# Patient Record
Sex: MALE | Race: WHITE | NOT HISPANIC OR LATINO | Employment: OTHER | URBAN - METROPOLITAN AREA
[De-identification: names, ages, dates, MRNs, and addresses within clinical notes are randomized per-mention and may not be internally consistent; named-entity substitution may affect disease eponyms.]

---

## 2017-01-06 ENCOUNTER — CONVERSION ENCOUNTER (OUTPATIENT)
Dept: ORTHOPEDIC SURGERY | Facility: CLINIC | Age: 62
End: 2017-01-06

## 2017-01-06 LAB
ALBUMIN SERPL-MCNC: 4.7 G/DL (ref 3.6–5.1)
ALBUMIN/GLOB SERPL: ABNORMAL {RATIO} (ref 1–2.5)
ALP SERPL-CCNC: 56 UNITS/L (ref 40–115)
ALT SERPL-CCNC: 29 UNITS/L (ref 9–46)
AST SERPL-CCNC: 25 UNITS/L (ref 10–35)
BASOPHILS # BLD AUTO: ABNORMAL 10*3/MM3 (ref 0–200)
BASOPHILS NFR BLD AUTO: 0.5 %
BILIRUB SERPL-MCNC: 0.6 MG/DL (ref 0.2–1.2)
BUN SERPL-MCNC: 19 MG/DL (ref 7–25)
BUN/CREAT SERPL: ABNORMAL (ref 6–22)
CALCIUM SERPL-MCNC: 9.5 MG/DL (ref 8.6–10.3)
CHLORIDE SERPL-SCNC: 107 MMOL/L (ref 98–110)
CHOLEST SERPL-MCNC: 242 MG/DL (ref 125–200)
CHOLEST/HDLC SERPL: ABNORMAL {RATIO}
CO2 CONTENT VENOUS: 23 MMOL/L (ref 20–31)
CONV NEUTROPHILS/100 LEUKOCYTES IN BODY FLUID BY MANUAL COUNT: 66 %
CONV TOTAL PROTEIN: 7.5 G/DL (ref 6.1–8.1)
CREAT UR-MCNC: 1.08 MG/DL (ref 0.7–1.25)
EOSINOPHIL # BLD AUTO: 2.1 %
EOSINOPHIL # BLD AUTO: ABNORMAL 10*3/MM3 (ref 15–500)
ERYTHROCYTE [DISTWIDTH] IN BLOOD BY AUTOMATED COUNT: 13.9 % (ref 11–15)
GLOBULIN UR ELPH-MCNC: ABNORMAL G/DL (ref 1.9–3.7)
GLUCOSE SERPL-MCNC: 102 MG/DL (ref 65–99)
HCT VFR BLD AUTO: 44.8 % (ref 38.5–50)
HDLC SERPL-MCNC: 64 MG/DL
HGB BLD-MCNC: 14.9 G/DL (ref 13.2–17.1)
LDLC SERPL CALC-MCNC: ABNORMAL MG/DL
LYMPHOCYTES # BLD AUTO: ABNORMAL 10*3/MM3 (ref 850–3900)
LYMPHOCYTES NFR BLD AUTO: 24.5 %
MCH RBC QN AUTO: 30.7 PG (ref 27–33)
MCHC RBC AUTO-ENTMCNC: ABNORMAL % (ref 32–36)
MCV RBC AUTO: 91.9 FL (ref 80–100)
MONOCYTES # BLD AUTO: ABNORMAL 10*3/MICROLITER (ref 200–950)
MONOCYTES NFR BLD AUTO: 6.9 %
NEUTROPHILS # BLD AUTO: ABNORMAL 10*3/MM3 (ref 1500–7800)
PLATELET # BLD AUTO: ABNORMAL 10*3/MM3 (ref 140–400)
PMV BLD AUTO: 8.7 FL (ref 7.5–11.5)
POTASSIUM SERPL-SCNC: 4.3 MMOL/L (ref 3.5–5.3)
PSA SERPL-MCNC: 0.3 NG/ML
RBC # BLD AUTO: ABNORMAL 10*6/MM3 (ref 4.2–5.8)
SODIUM SERPL-SCNC: 139 MMOL/L (ref 135–146)
TRIGL SERPL-MCNC: 127 MG/DL
TSH SERPL-ACNC: 3.06 MICROINTL UNITS/ML (ref 0.4–4.5)
URATE SERPL-MCNC: 5.7 MG/DL (ref 4–8)
WBC # BLD AUTO: ABNORMAL K/UL (ref 3.8–10.8)

## 2017-04-01 LAB
ALBUMIN SERPL-MCNC: 4.4 G/DL (ref 3.6–5.1)
ALBUMIN/GLOB SERPL: ABNORMAL {RATIO} (ref 1–2.5)
ALP SERPL-CCNC: 61 UNITS/L (ref 40–115)
ALT SERPL-CCNC: 29 UNITS/L (ref 9–46)
AST SERPL-CCNC: 29 UNITS/L (ref 10–35)
BILIRUB SERPL-MCNC: 0.8 MG/DL (ref 0.2–1.2)
BUN SERPL-MCNC: 21 MG/DL (ref 7–25)
BUN/CREAT SERPL: ABNORMAL (ref 6–22)
CALCIUM SERPL-MCNC: 9.6 MG/DL (ref 8.6–10.3)
CHLORIDE SERPL-SCNC: 106 MMOL/L (ref 98–110)
CHOLEST SERPL-MCNC: 141 MG/DL (ref 125–200)
CHOLEST/HDLC SERPL: ABNORMAL {RATIO}
CO2 CONTENT VENOUS: 22 MMOL/L (ref 20–31)
CONV TOTAL PROTEIN: 7 G/DL (ref 6.1–8.1)
CREAT UR-MCNC: 1.09 MG/DL (ref 0.7–1.25)
GLOBULIN UR ELPH-MCNC: ABNORMAL G/DL (ref 1.9–3.7)
GLUCOSE SERPL-MCNC: 114 MG/DL (ref 65–99)
HDLC SERPL-MCNC: 58 MG/DL
LDLC SERPL CALC-MCNC: ABNORMAL MG/DL
POTASSIUM SERPL-SCNC: 4.2 MMOL/L (ref 3.5–5.3)
SODIUM SERPL-SCNC: 138 MMOL/L (ref 135–146)
TRIGL SERPL-MCNC: 101 MG/DL

## 2018-01-09 ENCOUNTER — HOSPITAL ENCOUNTER (OUTPATIENT)
Dept: FAMILY MEDICINE CLINIC | Facility: CLINIC | Age: 63
Setting detail: SPECIMEN
Discharge: HOME | End: 2018-01-09
Attending: INTERNAL MEDICINE | Admitting: INTERNAL MEDICINE

## 2018-01-09 LAB
ALBUMIN SERPL-MCNC: 4.6 G/DL (ref 3.5–4.8)
ALBUMIN/GLOB SERPL: 1.5 {RATIO} (ref 1–1.7)
ALP SERPL-CCNC: 47 IU/L (ref 32–91)
ALT SERPL-CCNC: 34 IU/L (ref 17–63)
ANION GAP SERPL CALC-SCNC: 10.4 MMOL/L (ref 10–20)
AST SERPL-CCNC: 29 IU/L (ref 15–41)
BASOPHILS # BLD AUTO: 0 10*3/UL (ref 0–0.2)
BASOPHILS NFR BLD AUTO: 1 % (ref 0–2)
BILIRUB SERPL-MCNC: 0.8 MG/DL (ref 0.3–1.2)
BILIRUB UR QL STRIP: NEGATIVE MG/DL
BUN SERPL-MCNC: 12 MG/DL (ref 8–20)
BUN/CREAT SERPL: 10.9 (ref 6.2–20.3)
CALCIUM SERPL-MCNC: 9.7 MG/DL (ref 8.9–10.3)
CASTS URNS QL MICRO: NORMAL /[LPF]
CHLORIDE SERPL-SCNC: 107 MMOL/L (ref 101–111)
CHOLEST SERPL-MCNC: 149 MG/DL
CHOLEST/HDLC SERPL: 2.4 {RATIO}
COLOR UR: YELLOW
CONV BACTERIA IN URINE MICRO: NEGATIVE
CONV CLARITY OF URINE: CLEAR
CONV CO2: 25 MMOL/L (ref 22–32)
CONV HYALINE CASTS IN URINE MICRO: 0 /[LPF] (ref 0–5)
CONV LDL CHOLESTEROL DIRECT: 65 MG/DL (ref 0–100)
CONV PROTEIN IN URINE BY AUTOMATED TEST STRIP: NEGATIVE MG/DL
CONV SMALL ROUND CELLS: NORMAL /[HPF]
CONV TOTAL PROTEIN: 7.7 G/DL (ref 6.1–7.9)
CONV UROBILINOGEN IN URINE BY AUTOMATED TEST STRIP: 0.2 MG/DL
CREAT UR-MCNC: 1.1 MG/DL (ref 0.7–1.2)
CULTURE INDICATED?: NORMAL
DIFFERENTIAL METHOD BLD: (no result)
EOSINOPHIL # BLD AUTO: 0.2 10*3/UL (ref 0–0.3)
EOSINOPHIL # BLD AUTO: 3 % (ref 0–3)
ERYTHROCYTE [DISTWIDTH] IN BLOOD BY AUTOMATED COUNT: 13.1 % (ref 11.5–14.5)
GLOBULIN UR ELPH-MCNC: 3.1 G/DL (ref 2.5–3.8)
GLUCOSE SERPL-MCNC: 108 MG/DL (ref 65–99)
GLUCOSE UR QL: NEGATIVE MG/DL
HCT VFR BLD AUTO: 47.6 % (ref 40–54)
HDLC SERPL-MCNC: 63 MG/DL
HGB BLD-MCNC: 16 G/DL (ref 14–18)
HGB UR QL STRIP: NEGATIVE
KETONES UR QL STRIP: NEGATIVE MG/DL
LDLC/HDLC SERPL: 1 {RATIO}
LEUKOCYTE ESTERASE UR QL STRIP: NEGATIVE
LIPID INTERPRETATION: NORMAL
LYMPHOCYTES # BLD AUTO: 1.9 10*3/UL (ref 0.8–4.8)
LYMPHOCYTES NFR BLD AUTO: 27 % (ref 18–42)
MCH RBC QN AUTO: 31.2 PG (ref 26–32)
MCHC RBC AUTO-ENTMCNC: 33.6 G/DL (ref 32–36)
MCV RBC AUTO: 92.9 FL (ref 80–94)
MONOCYTES # BLD AUTO: 0.5 10*3/UL (ref 0.1–1.3)
MONOCYTES NFR BLD AUTO: 7 % (ref 2–11)
NEUTROPHILS # BLD AUTO: 4.4 10*3/UL (ref 2.3–8.6)
NEUTROPHILS NFR BLD AUTO: 62 % (ref 50–75)
NITRITE UR QL STRIP: NEGATIVE
NRBC BLD AUTO-RTO: 0 /100{WBCS}
NRBC/RBC NFR BLD MANUAL: 0 10*3/UL
PH UR STRIP.AUTO: 6 [PH] (ref 4.5–8)
PLATELET # BLD AUTO: 226 10*3/UL (ref 150–450)
PMV BLD AUTO: 8.2 FL (ref 7.4–10.4)
POTASSIUM SERPL-SCNC: 4.4 MMOL/L (ref 3.6–5.1)
RBC # BLD AUTO: 5.12 10*6/UL (ref 4.6–6)
RBC #/AREA URNS HPF: 1 /[HPF] (ref 0–3)
SODIUM SERPL-SCNC: 138 MMOL/L (ref 136–144)
SP GR UR: 1.02 (ref 1–1.03)
SPERM URNS QL MICRO: NORMAL /[HPF]
SQUAMOUS SPT QL MICRO: 0 /[HPF] (ref 0–5)
TRIGL SERPL-MCNC: 94 MG/DL
UNIDENT CRYS URNS QL MICRO: NORMAL /[HPF]
VLDLC SERPL CALC-MCNC: 20.6 MG/DL
WBC # BLD AUTO: 7.1 10*3/UL (ref 4.5–11.5)
WBC #/AREA URNS HPF: 1 /[HPF] (ref 0–5)
YEAST SPEC QL WET PREP: NORMAL /[HPF]

## 2018-03-16 ENCOUNTER — ON CAMPUS - OUTPATIENT (AMBULATORY)
Dept: URBAN - METROPOLITAN AREA HOSPITAL 2 | Facility: HOSPITAL | Age: 63
End: 2018-03-16

## 2018-03-16 ENCOUNTER — OFFICE (AMBULATORY)
Dept: URBAN - METROPOLITAN AREA PATHOLOGY 4 | Facility: PATHOLOGY | Age: 63
End: 2018-03-16

## 2018-03-16 VITALS
WEIGHT: 208 LBS | DIASTOLIC BLOOD PRESSURE: 64 MMHG | RESPIRATION RATE: 20 BRPM | SYSTOLIC BLOOD PRESSURE: 63 MMHG | SYSTOLIC BLOOD PRESSURE: 76 MMHG | HEART RATE: 96 BPM | HEART RATE: 52 BPM | DIASTOLIC BLOOD PRESSURE: 71 MMHG | DIASTOLIC BLOOD PRESSURE: 56 MMHG | HEART RATE: 97 BPM | SYSTOLIC BLOOD PRESSURE: 89 MMHG | HEIGHT: 71 IN | RESPIRATION RATE: 18 BRPM | SYSTOLIC BLOOD PRESSURE: 122 MMHG | HEART RATE: 62 BPM | SYSTOLIC BLOOD PRESSURE: 102 MMHG | HEART RATE: 83 BPM | RESPIRATION RATE: 16 BRPM | DIASTOLIC BLOOD PRESSURE: 43 MMHG | SYSTOLIC BLOOD PRESSURE: 92 MMHG | DIASTOLIC BLOOD PRESSURE: 57 MMHG | SYSTOLIC BLOOD PRESSURE: 79 MMHG | HEART RATE: 75 BPM | RESPIRATION RATE: 17 BRPM | OXYGEN SATURATION: 94 % | HEART RATE: 94 BPM | DIASTOLIC BLOOD PRESSURE: 65 MMHG | TEMPERATURE: 97.4 F | OXYGEN SATURATION: 97 % | DIASTOLIC BLOOD PRESSURE: 45 MMHG

## 2018-03-16 DIAGNOSIS — D12.3 BENIGN NEOPLASM OF TRANSVERSE COLON: ICD-10-CM

## 2018-03-16 DIAGNOSIS — Z86.010 PERSONAL HISTORY OF COLONIC POLYPS: ICD-10-CM

## 2018-03-16 DIAGNOSIS — K64.1 SECOND DEGREE HEMORRHOIDS: ICD-10-CM

## 2018-03-16 LAB
GI HISTOLOGY: A. UNSPECIFIED: (no result)
GI HISTOLOGY: PDF REPORT: (no result)

## 2018-03-16 PROCEDURE — 45385 COLONOSCOPY W/LESION REMOVAL: CPT | Mod: 33 | Performed by: INTERNAL MEDICINE

## 2018-03-16 PROCEDURE — 88305 TISSUE EXAM BY PATHOLOGIST: CPT | Performed by: INTERNAL MEDICINE

## 2018-03-16 RX ADMIN — PROPOFOL: 10 INJECTION, EMULSION INTRAVENOUS at 07:52

## 2018-04-12 ENCOUNTER — OFFICE (AMBULATORY)
Dept: URBAN - METROPOLITAN AREA CLINIC 64 | Facility: CLINIC | Age: 63
End: 2018-04-12

## 2018-04-12 VITALS — HEIGHT: 71 IN

## 2018-04-12 DIAGNOSIS — K64.1 SECOND DEGREE HEMORRHOIDS: ICD-10-CM

## 2018-04-12 PROCEDURE — 46221 LIGATION OF HEMORRHOID(S): CPT | Performed by: INTERNAL MEDICINE

## 2018-04-12 NOTE — SERVICEHPINOTES
HUGH ARTIS  is a    62   year old  male   who is being seen in the office today for    symptomatic hemorrhoids with itching, bleeding and soilage.

## 2018-05-07 ENCOUNTER — OFFICE (AMBULATORY)
Dept: URBAN - METROPOLITAN AREA CLINIC 64 | Facility: CLINIC | Age: 63
End: 2018-05-07

## 2018-05-07 VITALS — HEIGHT: 71 IN

## 2018-05-07 DIAGNOSIS — K60.2 ANAL FISSURE, UNSPECIFIED: ICD-10-CM

## 2018-05-07 DIAGNOSIS — K64.1 SECOND DEGREE HEMORRHOIDS: ICD-10-CM

## 2018-05-07 PROCEDURE — 46221 LIGATION OF HEMORRHOID(S): CPT | Performed by: INTERNAL MEDICINE

## 2018-05-07 NOTE — SERVICEHPINOTES
HUGH ARTIS is a 63 year old male who is being seen in the office today for symptomatic hemorrhoids with itching, bleeding and soilage.

## 2018-05-07 NOTE — SERVICENOTES
I attempted to place the band in the RP position, but it was a little low and caused a lot of pain so I removed it and placed one in the RA postion.

RP position for next banding

## 2018-08-16 ENCOUNTER — HOSPITAL ENCOUNTER (OUTPATIENT)
Dept: CARDIOLOGY | Facility: HOSPITAL | Age: 63
Discharge: HOME | End: 2018-08-16
Attending: INTERNAL MEDICINE | Admitting: INTERNAL MEDICINE

## 2019-03-08 ENCOUNTER — HOSPITAL ENCOUNTER (OUTPATIENT)
Dept: FAMILY MEDICINE CLINIC | Facility: CLINIC | Age: 64
Setting detail: SPECIMEN
Discharge: HOME | End: 2019-03-08
Attending: INTERNAL MEDICINE | Admitting: INTERNAL MEDICINE

## 2019-03-08 LAB
ALBUMIN SERPL-MCNC: 4.3 G/DL (ref 3.5–4.8)
ALBUMIN/GLOB SERPL: 1.3 {RATIO} (ref 1–1.7)
ALP SERPL-CCNC: 48 IU/L (ref 32–91)
ALT SERPL-CCNC: 31 IU/L (ref 17–63)
ANION GAP SERPL CALC-SCNC: 14.8 MMOL/L (ref 10–20)
AST SERPL-CCNC: 28 IU/L (ref 15–41)
BASOPHILS # BLD AUTO: 0.1 10*3/UL (ref 0–0.2)
BASOPHILS NFR BLD AUTO: 1 % (ref 0–2)
BILIRUB SERPL-MCNC: 1 MG/DL (ref 0.3–1.2)
BUN SERPL-MCNC: 20 MG/DL (ref 8–20)
BUN/CREAT SERPL: 18.2 (ref 6.2–20.3)
CALCIUM SERPL-MCNC: 9.2 MG/DL (ref 8.9–10.3)
CHLORIDE SERPL-SCNC: 106 MMOL/L (ref 101–111)
CHOLEST SERPL-MCNC: 151 MG/DL
CHOLEST/HDLC SERPL: 2.5 {RATIO}
CONV CO2: 21 MMOL/L (ref 22–32)
CONV LDL CHOLESTEROL DIRECT: 73 MG/DL (ref 0–100)
CONV TOTAL PROTEIN: 7.5 G/DL (ref 6.1–7.9)
CREAT UR-MCNC: 1.1 MG/DL (ref 0.7–1.2)
DIFFERENTIAL METHOD BLD: (no result)
EOSINOPHIL # BLD AUTO: 0.2 10*3/UL (ref 0–0.3)
EOSINOPHIL # BLD AUTO: 3 % (ref 0–3)
ERYTHROCYTE [DISTWIDTH] IN BLOOD BY AUTOMATED COUNT: 13.1 % (ref 11.5–14.5)
GLOBULIN UR ELPH-MCNC: 3.2 G/DL (ref 2.5–3.8)
GLUCOSE SERPL-MCNC: 92 MG/DL (ref 65–99)
HCT VFR BLD AUTO: 44.5 % (ref 40–54)
HDLC SERPL-MCNC: 61 MG/DL
HGB BLD-MCNC: 15 G/DL (ref 14–18)
LDLC/HDLC SERPL: 1.2 {RATIO}
LIPID INTERPRETATION: NORMAL
LYMPHOCYTES # BLD AUTO: 2.2 10*3/UL (ref 0.8–4.8)
LYMPHOCYTES NFR BLD AUTO: 27 % (ref 18–42)
MCH RBC QN AUTO: 31.6 PG (ref 26–32)
MCHC RBC AUTO-ENTMCNC: 33.7 G/DL (ref 32–36)
MCV RBC AUTO: 93.8 FL (ref 80–94)
MONOCYTES # BLD AUTO: 0.6 10*3/UL (ref 0.1–1.3)
MONOCYTES NFR BLD AUTO: 7 % (ref 2–11)
NEUTROPHILS # BLD AUTO: 5 10*3/UL (ref 2.3–8.6)
NEUTROPHILS NFR BLD AUTO: 62 % (ref 50–75)
NRBC BLD AUTO-RTO: 0 /100{WBCS}
NRBC/RBC NFR BLD MANUAL: 0 10*3/UL
PLATELET # BLD AUTO: 216 10*3/UL (ref 150–450)
PMV BLD AUTO: 8.6 FL (ref 7.4–10.4)
POTASSIUM SERPL-SCNC: 3.8 MMOL/L (ref 3.6–5.1)
RBC # BLD AUTO: 4.74 10*6/UL (ref 4.6–6)
SODIUM SERPL-SCNC: 138 MMOL/L (ref 136–144)
TRIGL SERPL-MCNC: 105 MG/DL
URATE SERPL-MCNC: 6.1 MG/DL (ref 4.8–8.7)
VLDLC SERPL CALC-MCNC: 17.8 MG/DL
WBC # BLD AUTO: 8 10*3/UL (ref 4.5–11.5)

## 2019-03-14 ENCOUNTER — HOSPITAL ENCOUNTER (OUTPATIENT)
Dept: ORTHOPEDIC SURGERY | Facility: CLINIC | Age: 64
Discharge: HOME | End: 2019-03-14
Attending: PODIATRIST | Admitting: PODIATRIST

## 2019-09-23 RX ORDER — ALPRAZOLAM 1 MG/1
TABLET ORAL
Qty: 30 TABLET | Refills: 5 | Status: SHIPPED | OUTPATIENT
Start: 2019-09-23 | End: 2020-02-07 | Stop reason: SDUPTHER

## 2020-02-07 ENCOUNTER — OFFICE VISIT (OUTPATIENT)
Dept: FAMILY MEDICINE CLINIC | Facility: CLINIC | Age: 65
End: 2020-02-07

## 2020-02-07 VITALS
OXYGEN SATURATION: 98 % | BODY MASS INDEX: 28.56 KG/M2 | WEIGHT: 204 LBS | HEIGHT: 71 IN | RESPIRATION RATE: 16 BRPM | DIASTOLIC BLOOD PRESSURE: 78 MMHG | HEART RATE: 62 BPM | SYSTOLIC BLOOD PRESSURE: 118 MMHG | TEMPERATURE: 97.7 F

## 2020-02-07 DIAGNOSIS — Z12.5 SCREENING FOR PROSTATE CANCER: ICD-10-CM

## 2020-02-07 DIAGNOSIS — E53.9 VITAMIN B DEFICIENCY: ICD-10-CM

## 2020-02-07 DIAGNOSIS — N40.1 BENIGN PROSTATIC HYPERPLASIA WITH LOWER URINARY TRACT SYMPTOMS, SYMPTOM DETAILS UNSPECIFIED: ICD-10-CM

## 2020-02-07 DIAGNOSIS — M1A.9XX0 CHRONIC GOUT WITHOUT TOPHUS, UNSPECIFIED CAUSE, UNSPECIFIED SITE: ICD-10-CM

## 2020-02-07 DIAGNOSIS — E78.41 ELEVATED LIPOPROTEIN(A): ICD-10-CM

## 2020-02-07 DIAGNOSIS — Z00.00 ENCOUNTER FOR GENERAL ADULT MEDICAL EXAMINATION WITHOUT ABNORMAL FINDINGS: Primary | ICD-10-CM

## 2020-02-07 PROBLEM — I20.9 ANGINA, CLASS III: Status: ACTIVE | Noted: 2018-09-27

## 2020-02-07 PROBLEM — M19.079 ARTHRITIS OF FOOT: Status: ACTIVE | Noted: 2019-03-14

## 2020-02-07 PROBLEM — N20.0 KIDNEY STONES: Status: ACTIVE | Noted: 2020-02-07

## 2020-02-07 PROBLEM — S92.145A: Status: ACTIVE | Noted: 2019-03-28

## 2020-02-07 PROBLEM — E78.5 HYPERLIPIDEMIA: Status: ACTIVE | Noted: 2020-02-07

## 2020-02-07 PROBLEM — E29.1 TESTICULAR HYPOFUNCTION: Status: ACTIVE | Noted: 2020-02-07

## 2020-02-07 PROBLEM — N40.0 BENIGN PROSTATIC HYPERPLASIA: Status: ACTIVE | Noted: 2020-02-07

## 2020-02-07 PROBLEM — Z80.0 FAMILY HISTORY OF MALIGNANT NEOPLASM OF COLON: Status: ACTIVE | Noted: 2020-02-07

## 2020-02-07 PROBLEM — K21.9 GASTROESOPHAGEAL REFLUX DISEASE: Status: ACTIVE | Noted: 2020-02-07

## 2020-02-07 LAB
ALBUMIN SERPL-MCNC: 4.7 G/DL (ref 3.5–5.2)
ALBUMIN/GLOB SERPL: 1.9 G/DL
ALP SERPL-CCNC: 49 U/L (ref 39–117)
ALT SERPL W P-5'-P-CCNC: 18 U/L (ref 1–41)
ANION GAP SERPL CALCULATED.3IONS-SCNC: 12.3 MMOL/L (ref 5–15)
AST SERPL-CCNC: 16 U/L (ref 1–40)
BASOPHILS # BLD AUTO: 0.03 10*3/MM3 (ref 0–0.2)
BASOPHILS NFR BLD AUTO: 0.5 % (ref 0–1.5)
BILIRUB BLD-MCNC: NEGATIVE MG/DL
BILIRUB SERPL-MCNC: 0.8 MG/DL (ref 0.2–1.2)
BUN BLD-MCNC: 19 MG/DL (ref 8–23)
BUN/CREAT SERPL: 24.1 (ref 7–25)
CALCIUM SPEC-SCNC: 9.5 MG/DL (ref 8.6–10.5)
CHLORIDE SERPL-SCNC: 103 MMOL/L (ref 98–107)
CHOLEST SERPL-MCNC: 148 MG/DL (ref 0–200)
CLARITY, POC: CLEAR
CO2 SERPL-SCNC: 22.7 MMOL/L (ref 22–29)
COLOR UR: YELLOW
CREAT BLD-MCNC: 0.79 MG/DL (ref 0.76–1.27)
DEPRECATED RDW RBC AUTO: 40.4 FL (ref 37–54)
EOSINOPHIL # BLD AUTO: 0.23 10*3/MM3 (ref 0–0.4)
EOSINOPHIL NFR BLD AUTO: 3.6 % (ref 0.3–6.2)
ERYTHROCYTE [DISTWIDTH] IN BLOOD BY AUTOMATED COUNT: 12.3 % (ref 12.3–15.4)
GFR SERPL CREATININE-BSD FRML MDRD: 98 ML/MIN/1.73
GLOBULIN UR ELPH-MCNC: 2.5 GM/DL
GLUCOSE BLD-MCNC: 103 MG/DL (ref 65–99)
GLUCOSE UR STRIP-MCNC: NEGATIVE MG/DL
HCT VFR BLD AUTO: 44.3 % (ref 37.5–51)
HDLC SERPL-MCNC: 63 MG/DL (ref 40–60)
HGB BLD-MCNC: 14.8 G/DL (ref 13–17.7)
IMM GRANULOCYTES # BLD AUTO: 0.04 10*3/MM3 (ref 0–0.05)
IMM GRANULOCYTES NFR BLD AUTO: 0.6 % (ref 0–0.5)
KETONES UR QL: NEGATIVE
LDLC SERPL CALC-MCNC: 65 MG/DL (ref 0–100)
LDLC/HDLC SERPL: 1.04 {RATIO}
LEUKOCYTE EST, POC: NEGATIVE
LYMPHOCYTES # BLD AUTO: 1.97 10*3/MM3 (ref 0.7–3.1)
LYMPHOCYTES NFR BLD AUTO: 30.5 % (ref 19.6–45.3)
MCH RBC QN AUTO: 30.5 PG (ref 26.6–33)
MCHC RBC AUTO-ENTMCNC: 33.4 G/DL (ref 31.5–35.7)
MCV RBC AUTO: 91.3 FL (ref 79–97)
MONOCYTES # BLD AUTO: 0.57 10*3/MM3 (ref 0.1–0.9)
MONOCYTES NFR BLD AUTO: 8.8 % (ref 5–12)
NEUTROPHILS # BLD AUTO: 3.62 10*3/MM3 (ref 1.7–7)
NEUTROPHILS NFR BLD AUTO: 56 % (ref 42.7–76)
NITRITE UR-MCNC: NEGATIVE MG/ML
NRBC BLD AUTO-RTO: 0.2 /100 WBC (ref 0–0.2)
PH UR: 7 [PH] (ref 5–8)
PLATELET # BLD AUTO: 217 10*3/MM3 (ref 140–450)
PMV BLD AUTO: 10.6 FL (ref 6–12)
POTASSIUM BLD-SCNC: 4.7 MMOL/L (ref 3.5–5.2)
PROT SERPL-MCNC: 7.2 G/DL (ref 6–8.5)
PROT UR STRIP-MCNC: NEGATIVE MG/DL
PSA SERPL-MCNC: 0.24 NG/ML (ref 0–4)
RBC # BLD AUTO: 4.85 10*6/MM3 (ref 4.14–5.8)
RBC # UR STRIP: ABNORMAL /UL
SODIUM BLD-SCNC: 138 MMOL/L (ref 136–145)
SP GR UR: 1.02 (ref 1–1.03)
TRIGL SERPL-MCNC: 98 MG/DL (ref 0–150)
URATE SERPL-MCNC: 5.1 MG/DL (ref 3.4–7)
UROBILINOGEN UR QL: NORMAL
VLDLC SERPL-MCNC: 19.6 MG/DL (ref 5–40)
WBC NRBC COR # BLD: 6.46 10*3/MM3 (ref 3.4–10.8)

## 2020-02-07 PROCEDURE — G0438 PPPS, INITIAL VISIT: HCPCS | Performed by: INTERNAL MEDICINE

## 2020-02-07 PROCEDURE — 96372 THER/PROPH/DIAG INJ SC/IM: CPT | Performed by: INTERNAL MEDICINE

## 2020-02-07 PROCEDURE — 80053 COMPREHEN METABOLIC PANEL: CPT | Performed by: INTERNAL MEDICINE

## 2020-02-07 PROCEDURE — 81003 URINALYSIS AUTO W/O SCOPE: CPT | Performed by: INTERNAL MEDICINE

## 2020-02-07 PROCEDURE — 85025 COMPLETE CBC W/AUTO DIFF WBC: CPT | Performed by: INTERNAL MEDICINE

## 2020-02-07 PROCEDURE — 84550 ASSAY OF BLOOD/URIC ACID: CPT | Performed by: INTERNAL MEDICINE

## 2020-02-07 PROCEDURE — 99213 OFFICE O/P EST LOW 20 MIN: CPT | Performed by: INTERNAL MEDICINE

## 2020-02-07 PROCEDURE — 80061 LIPID PANEL: CPT | Performed by: INTERNAL MEDICINE

## 2020-02-07 PROCEDURE — G0103 PSA SCREENING: HCPCS | Performed by: INTERNAL MEDICINE

## 2020-02-07 RX ORDER — ROSUVASTATIN CALCIUM 10 MG/1
10 TABLET, COATED ORAL DAILY
Qty: 90 TABLET | Refills: 3 | Status: SHIPPED | OUTPATIENT
Start: 2020-02-07 | End: 2020-08-10 | Stop reason: SDUPTHER

## 2020-02-07 RX ORDER — ALLOPURINOL 300 MG/1
TABLET ORAL
COMMUNITY
Start: 2019-12-03 | End: 2020-08-10 | Stop reason: SDUPTHER

## 2020-02-07 RX ORDER — ROSUVASTATIN CALCIUM 10 MG/1
TABLET, COATED ORAL
COMMUNITY
Start: 2019-12-21 | End: 2020-02-07 | Stop reason: SDUPTHER

## 2020-02-07 RX ORDER — ALPRAZOLAM 1 MG/1
1 TABLET ORAL
Qty: 30 TABLET | Refills: 5 | Status: SHIPPED | OUTPATIENT
Start: 2020-02-07 | End: 2020-08-05

## 2020-02-07 RX ORDER — CYANOCOBALAMIN 1000 UG/ML
1000 INJECTION, SOLUTION INTRAMUSCULAR; SUBCUTANEOUS ONCE
Status: COMPLETED | OUTPATIENT
Start: 2020-02-07 | End: 2020-02-07

## 2020-02-07 RX ORDER — MELOXICAM 15 MG/1
TABLET ORAL EVERY 24 HOURS
COMMUNITY
Start: 2019-03-14 | End: 2021-05-23

## 2020-02-07 RX ADMIN — CYANOCOBALAMIN 1000 MCG: 1000 INJECTION, SOLUTION INTRAMUSCULAR; SUBCUTANEOUS at 10:13

## 2020-02-07 NOTE — PATIENT INSTRUCTIONS
Advance Directive    Advance directives are legal documents that let you make choices ahead of time about your health care and medical treatment in case you become unable to communicate for yourself. Advance directives are a way for you to communicate your wishes to family, friends, and health care providers. This can help convey your decisions about end-of-life care if you become unable to communicate.  Discussing and writing advance directives should happen over time rather than all at once. Advance directives can be changed depending on your situation and what you want, even after you have signed the advance directives.  If you do not have an advance directive, some states assign family decision makers to act on your behalf based on how closely you are related to them. Each state has its own laws regarding advance directives. You may want to check with your health care provider, , or state representative about the laws in your state. There are different types of advance directives, such as:  · Medical power of .  · Living will.  · Do not resuscitate (DNR) or do not attempt resuscitation (DNAR) order.  Health care proxy and medical power of   A health care proxy, also called a health care agent, is a person who is appointed to make medical decisions for you in cases in which you are unable to make the decisions yourself. Generally, people choose someone they know well and trust to represent their preferences. Make sure to ask this person for an agreement to act as your proxy. A proxy may have to exercise judgment in the event of a medical decision for which your wishes are not known.  A medical power of  is a legal document that names your health care proxy. Depending on the laws in your state, after the document is written, it may also need to be:  · Signed.  · Notarized.  · Dated.  · Copied.  · Witnessed.  · Incorporated into your medical record.  You may also want to appoint  someone to manage your financial affairs in a situation in which you are unable to do so. This is called a durable power of  for finances. It is a separate legal document from the durable power of  for health care. You may choose the same person or someone different from your health care proxy to act as your agent in financial matters.  If you do not appoint a proxy, or if there is a concern that the proxy is not acting in your best interests, a court-appointed guardian may be designated to act on your behalf.  Living will  A living will is a set of instructions documenting your wishes about medical care when you cannot express them yourself. Health care providers should keep a copy of your living will in your medical record. You may want to give a copy to family members or friends. To alert caregivers in case of an emergency, you can place a card in your wallet to let them know that you have a living will and where they can find it. A living will is used if you become:  · Terminally ill.  · Incapacitated.  · Unable to communicate or make decisions.  Items to consider in your living will include:  · The use or non-use of life-sustaining equipment, such as dialysis machines and breathing machines (ventilators).  · A DNR or DNAR order, which is the instruction not to use cardiopulmonary resuscitation (CPR) if breathing or heartbeat stops.  · The use or non-use of tube feeding.  · Withholding of food and fluids.  · Comfort (palliative) care when the goal becomes comfort rather than a cure.  · Organ and tissue donation.  A living will does not give instructions for distributing your money and property if you should pass away. It is recommended that you seek the advice of a  when writing a will. Decisions about taxes, beneficiaries, and asset distribution will be legally binding. This process can relieve your family and friends of any concerns surrounding disputes or questions that may come up about  the distribution of your assets.  DNR or DNAR  A DNR or DNAR order is a request not to have CPR in the event that your heart stops beating or you stop breathing. If a DNR or DNAR order has not been made and shared, a health care provider will try to help any patient whose heart has stopped or who has stopped breathing. If you plan to have surgery, talk with your health care provider about how your DNR or DNAR order will be followed if problems occur.  Summary  · Advance directives are the legal documents that allow you to make choices ahead of time about your health care and medical treatment in case you become unable to communicate for yourself.  · The process of discussing and writing advance directives should happen over time. You can change the advance directives, even after you have signed them.  · Advance directives include DNR or DNAR orders, living kelly, and designating an agent as your medical power of .  This information is not intended to replace advice given to you by your health care provider. Make sure you discuss any questions you have with your health care provider.  Document Released: 03/26/2009 Document Revised: 11/06/2017 Document Reviewed: 11/06/2017  [employer] Interactive Patient Education © 2019 [employer]      Exercising to Stay Healthy  To become healthy and stay healthy, it is recommended that you do moderate-intensity and vigorous-intensity exercise. You can tell that you are exercising at a moderate intensity if your heart starts beating faster and you start breathing faster but can still hold a conversation. You can tell that you are exercising at a vigorous intensity if you are breathing much harder and faster and cannot hold a conversation while exercising.  Exercising regularly is important. It has many health benefits, such as:  · Improving overall fitness, flexibility, and endurance.  · Increasing bone density.  · Helping with weight control.  · Decreasing body  fat.  · Increasing muscle strength.  · Reducing stress and tension.  · Improving overall health.  How often should I exercise?  Choose an activity that you enjoy, and set realistic goals. Your health care provider can help you make an activity plan that works for you.  Exercise regularly as told by your health care provider. This may include:  · Doing strength training two times a week, such as:  ? Lifting weights.  ? Using resistance bands.  ? Push-ups.  ? Sit-ups.  ? Yoga.  · Doing a certain intensity of exercise for a given amount of time. Choose from these options:  ? A total of 150 minutes of moderate-intensity exercise every week.  ? A total of 75 minutes of vigorous-intensity exercise every week.  ? A mix of moderate-intensity and vigorous-intensity exercise every week.  Children, pregnant women, people who have not exercised regularly, people who are overweight, and older adults may need to talk with a health care provider about what activities are safe to do. If you have a medical condition, be sure to talk with your health care provider before you start a new exercise program.  What are some exercise ideas?  Moderate-intensity exercise ideas include:  · Walking 1 mile (1.6 km) in about 15 minutes.  · Biking.  · Hiking.  · Golfing.  · Dancing.  · Water aerobics.  Vigorous-intensity exercise ideas include:  · Walking 4.5 miles (7.2 km) or more in about 1 hour.  · Jogging or running 5 miles (8 km) in about 1 hour.  · Biking 10 miles (16.1 km) or more in about 1 hour.  · Lap swimming.  · Roller-skating or in-line skating.  · Cross-country skiing.  · Vigorous competitive sports, such as football, basketball, and soccer.  · Jumping rope.  · Aerobic dancing.  What are some everyday activities that can help me to get exercise?  · Yard work, such as:  ? Pushing a .  ? Raking and bagging leaves.  · Washing your car.  · Pushing a stroller.  · Shoveling snow.  · Gardening.  · Washing windows or floors.  How  can I be more active in my day-to-day activities?  · Use stairs instead of an elevator.  · Take a walk during your lunch break.  · If you drive, park your car farther away from your work or school.  · If you take public transportation, get off one stop early and walk the rest of the way.  · Stand up or walk around during all of your indoor phone calls.  · Get up, stretch, and walk around every 30 minutes throughout the day.  · Enjoy exercise with a friend. Support to continue exercising will help you keep a regular routine of activity.  What guidelines can I follow while exercising?  · Before you start a new exercise program, talk with your health care provider.  · Do not exercise so much that you hurt yourself, feel dizzy, or get very short of breath.  · Wear comfortable clothes and wear shoes with good support.  · Drink plenty of water while you exercise to prevent dehydration or heat stroke.  · Work out until your breathing and your heartbeat get faster.  Where to find more information  · [place] Department of Health and Human Services: www.hhs.gov  · Centers for Disease Control and Prevention (CDC): www.cdc.gov  Summary  · Exercising regularly is important. It will improve your overall fitness, flexibility, and endurance.  · Regular exercise also will improve your overall health. It can help you control your weight, reduce stress, and improve your bone density.  · Do not exercise so much that you hurt yourself, feel dizzy, or get very short of breath.  · Before you start a new exercise program, talk with your health care provider.  This information is not intended to replace advice given to you by your health care provider. Make sure you discuss any questions you have with your health care provider.  Document Released: 01/20/2012 Document Revised: 11/08/2018 Document Reviewed: 11/08/2018  [employer] Interactive Patient Education © 2019 [employer]    Please check with your insurance and get the shingrix vaccine  series to help prevent shingles.

## 2020-02-07 NOTE — PROGRESS NOTES
The ABCs of the Annual Wellness Visit  Initial Medicare Wellness Visit    Chief Complaint   Patient presents with   • Annual Exam     MCR wellness       Subjective   History of Present Illness:  Joe Noel is a 64 y.o. male who presents for an Initial Medicare Wellness Visit and other concerns. Is retired now and elderly aunt is living with them and he is primary care giver.  [facility]- plans to work at [employer] this spring.  Wife still working so he doing house work- back aggravated recent;y- unsure if from gym or home activities- was radiating down left leg some -stretching and helping   arthritis getting worse in hands and feet. Fell at work last march-  Was on mobic and helped- then quit but recently started back  increased bruising and thin skin- started collagen to help it   no depression or anxiety currently- does have bad days. Takes 1/2 xanax every night  Denies chest pain or GERD   had renal stones in past and unsure if that is coming back   c scope in 2018- mom with colon cancer-   Did banding for hemorrhoids- but still has itching.       HEALTH RISK ASSESSMENT    Recent Hospitalizations:  No hospitalization(s) within the last year.    Current Medical Providers:  Patient Care Team:  Niurka Barrios MD as PCP - General (Internal Medicine)    Smoking Status:  Social History     Tobacco Use   Smoking Status Never Smoker   Smokeless Tobacco Never Used       Alcohol Consumption:  Social History     Substance and Sexual Activity   Alcohol Use Not Currently       Depression Screen:   PHQ-2/PHQ-9 Depression Screening 2/7/2020   Little interest or pleasure in doing things 0   Feeling down, depressed, or hopeless 0   Total Score 0       Fall Risk Screen:  STEADI Fall Risk Assessment was completed, and patient is at HIGH risk for falls. Assessment completed on:2/7/2020    Health Habits and Functional and Cognitive Screening:  Functional & Cognitive Status 2/7/2020   Do you have  difficulty preparing food and eating? No   Do you have difficulty bathing yourself, getting dressed or grooming yourself? No   Do you have difficulty using the toilet? No   Do you have difficulty moving around from place to place? No   Do you have trouble with steps or getting out of a bed or a chair? No   Current Diet Limited Junk Food   Dental Exam Up to date   Eye Exam Up to date   Exercise (times per week) 7 times per week   Current Exercise Activities Include Cardiovasular Workout on Exercise Equipment   Do you need help using the phone?  No   Are you deaf or do you have serious difficulty hearing?  No   Do you need help with transportation? No   Do you need help shopping? No   Do you need help preparing meals?  No   Do you need help with housework?  No   Do you need help with laundry? No   Do you need help taking your medications? No   Do you need help managing money? No   Do you ever drive or ride in a car without wearing a seat belt? No   Have you felt unusual stress, anger or loneliness in the last month? No   Who do you live with? Spouse   If you need help, do you have trouble finding someone available to you? No   Have you been bothered in the last four weeks by sexual problems? No   Do you have difficulty concentrating, remembering or making decisions? No         Does the patient have evidence of cognitive impairment? No    Asprin use counseling:Does not need ASA (and currently is not on it)    Age-appropriate Screening Schedule:  Refer to the list below for future screening recommendations based on patient's age, sex and/or medical conditions. Orders for these recommended tests are listed in the plan section. The patient has been provided with a written plan.    Health Maintenance   Topic Date Due   • TDAP/TD VACCINES (1 - Tdap) 05/05/1966   • ZOSTER VACCINE (1 of 2) 05/05/2003   • LIPID PANEL  02/07/2021   • COLONOSCOPY  08/01/2023   • INFLUENZA VACCINE  Completed          The following portions of the  patient's history were reviewed and updated as appropriate: allergies, current medications, past family history, past medical history, past social history, past surgical history and problem list.    Outpatient Medications Prior to Visit   Medication Sig Dispense Refill   • allopurinol (ZYLOPRIM) 300 MG tablet      • meloxicam (MOBIC) 15 MG tablet Daily.     • ALPRAZolam (XANAX) 1 MG tablet TAKE 1 TABLET BY MOUTH EVERY NIGHT AT BEDTIME 30 tablet 5   • rosuvastatin (CRESTOR) 10 MG tablet        No facility-administered medications prior to visit.        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    Review of Systems   Constitutional: Negative for activity change and fatigue.   HENT: Negative for congestion.    Respiratory: Negative for apnea and wheezing.    Cardiovascular: Negative for chest pain and palpitations.   Genitourinary: Negative for urgency.   Musculoskeletal: Positive for arthralgias.   Neurological: Positive for weakness. Negative for headaches.   Hematological: Bruises/bleeds easily.   Psychiatric/Behavioral: Positive for sleep disturbance.       Compared to one year ago, the patient feels his physical health is better.  Compared to one year ago, the patient feels his mental health is better.    Reviewed chart for potential of high risk medication in the elderly:  "yes  Reviewed chart for potential of harmful drug interactions in the elderly:yes    Objective         Vitals:    02/07/20 0941   BP: 118/78   BP Location: Left arm   Patient Position: Sitting   Cuff Size: Adult   Pulse: 62   Resp: 16   Temp: 97.7 °F (36.5 °C)   TempSrc: Oral   SpO2: 98%   Weight: 92.5 kg (204 lb)   Height: 179.3 cm (70.6\")       Body mass index is 28.78 kg/m².  Discussed the patient's BMI with him. The BMI is in the acceptable range.    Physical Exam   Constitutional: He is oriented to person, place, and time. He appears well-developed and well-nourished.   HENT:   Head: Normocephalic and atraumatic.   Right Ear: Tympanic membrane normal.   Left Ear: Tympanic membrane normal.   Eyes: Pupils are equal, round, and reactive to light.   Neck: Normal range of motion. Neck supple.   Cardiovascular: Normal rate and regular rhythm.   No murmur heard.  Pulmonary/Chest: Effort normal and breath sounds normal.   Abdominal: Soft. Bowel sounds are normal. He exhibits no distension.   Neurological: He is oriented to person, place, and time.   Skin: Capillary refill takes less than 2 seconds.   Psychiatric: He has a normal mood and affect.   Nursing note and vitals reviewed.      Advance Care Planning   ACP discussion was held with the patient during this visit.  Lab Results   Component Value Date    TRIG 98 02/07/2020    HDL 63 (H) 02/07/2020    LDL 65 02/07/2020    VLDL 19.6 02/07/2020        Assessment/Plan   Medicare Risks and Personalized Health Plan  CMS Preventative Services Quick Reference  Fall Risk    The above risks/problems have been discussed with the patient.  Pertinent information has been shared with the patient in the After Visit Summary.  Follow up plans and orders are seen below in the Assessment/Plan Section.    Diagnoses and all orders for this visit:    1. Encounter for general adult medical examination without abnormal findings (Primary)  Comments:  all recommendations discussed     2. " Chronic gout without tophus, unspecified cause, unspecified site  -     Uric acid    3. Elevated lipoprotein(a)  -     Lipid Panel  -     Comprehensive Metabolic Panel    4. Vitamin B deficiency  -     CBC & Differential  -     cyanocobalamin injection 1,000 mcg  -     CBC Auto Differential    5. Benign prostatic hyperplasia with lower urinary tract symptoms, symptom details unspecified    6. Screening for prostate cancer  Comments:  check psa  Orders:  -     PSA Screen  -     POC Urinalysis Dipstick, Automated    Other orders  -     rosuvastatin (CRESTOR) 10 MG tablet; Take 1 tablet by mouth Daily.  Dispense: 90 tablet; Refill: 3  -     ALPRAZolam (XANAX) 1 MG tablet; Take 1 tablet by mouth every night at bedtime.  Dispense: 30 tablet; Refill: 5      Follow Up:  Return in about 6 months (around 8/7/2020), or if symptoms worsen or fail to improve.     An After Visit Summary and PPPS were given to the patient.       During this visit for their annual exam, we reviewed their personal history, social history and family history.  We went over their medications and all the recommended health maintenence items for their age group. They were given the opportunity to ask questions and discuss other concerns.

## 2020-02-09 PROBLEM — Z12.5 SCREENING FOR PROSTATE CANCER: Status: ACTIVE | Noted: 2020-02-09

## 2020-08-05 DIAGNOSIS — F41.9 ANXIETY: Primary | ICD-10-CM

## 2020-08-05 RX ORDER — ALPRAZOLAM 1 MG/1
1 TABLET ORAL
Qty: 30 TABLET | Refills: 1 | Status: SHIPPED | OUTPATIENT
Start: 2020-08-05 | End: 2020-11-09

## 2020-08-10 ENCOUNTER — TELEPHONE (OUTPATIENT)
Dept: FAMILY MEDICINE CLINIC | Facility: CLINIC | Age: 65
End: 2020-08-10

## 2020-08-10 RX ORDER — ROSUVASTATIN CALCIUM 10 MG/1
10 TABLET, COATED ORAL DAILY
Qty: 90 TABLET | Refills: 3 | Status: SHIPPED | OUTPATIENT
Start: 2020-08-10 | End: 2021-10-26 | Stop reason: SDUPTHER

## 2020-08-10 RX ORDER — ALLOPURINOL 300 MG/1
300 TABLET ORAL DAILY
Qty: 90 TABLET | Refills: 3 | Status: SHIPPED | OUTPATIENT
Start: 2020-08-10 | End: 2021-10-26 | Stop reason: SDUPTHER

## 2020-08-10 NOTE — TELEPHONE ENCOUNTER
Patient called back and stated he would like a 90 day supply of each be sent in [facility]     Please advise     [phone]

## 2020-11-07 DIAGNOSIS — F41.9 ANXIETY: ICD-10-CM

## 2020-11-09 RX ORDER — ALPRAZOLAM 1 MG/1
1 TABLET ORAL
Qty: 30 TABLET | Refills: 3 | Status: SHIPPED | OUTPATIENT
Start: 2020-11-09 | End: 2021-03-11 | Stop reason: SDUPTHER

## 2021-03-11 DIAGNOSIS — F41.9 ANXIETY: ICD-10-CM

## 2021-03-11 RX ORDER — ALPRAZOLAM 1 MG/1
1 TABLET ORAL
Qty: 30 TABLET | Refills: 3 | Status: SHIPPED | OUTPATIENT
Start: 2021-03-11 | End: 2022-05-19 | Stop reason: SDUPTHER

## 2021-03-11 NOTE — TELEPHONE ENCOUNTER
Caller: Yumiko Noely JESSICA      Best call back number: [phone]    Medication needed:   Requested Prescriptions     Pending Prescriptions Disp Refills   • ALPRAZolam (XANAX) 1 MG tablet 30 tablet 3     Sig: Take 1 tablet by mouth every night at bedtime.       When do you need the refill by: 2 WEEKS    Does the patient have less than a 3 day supply:  [] Yes  [x] No    What is the patient's preferred pharmacy: [id number] - [place], [place] - [address] - [phone]  - [phone] FX

## 2021-04-20 ENCOUNTER — OFFICE VISIT (OUTPATIENT)
Dept: PODIATRY | Facility: CLINIC | Age: 66
End: 2021-04-20

## 2021-04-20 VITALS
BODY MASS INDEX: 25.62 KG/M2 | DIASTOLIC BLOOD PRESSURE: 78 MMHG | WEIGHT: 183 LBS | SYSTOLIC BLOOD PRESSURE: 160 MMHG | HEART RATE: 58 BPM | HEIGHT: 71 IN

## 2021-04-20 DIAGNOSIS — M19.071 PRIMARY OSTEOARTHRITIS OF RIGHT FOOT: ICD-10-CM

## 2021-04-20 DIAGNOSIS — M79.671 RIGHT FOOT PAIN: Primary | ICD-10-CM

## 2021-04-20 PROCEDURE — 99213 OFFICE O/P EST LOW 20 MIN: CPT | Performed by: PODIATRIST

## 2021-04-20 PROCEDURE — 20606 DRAIN/INJ JOINT/BURSA W/US: CPT | Performed by: PODIATRIST

## 2021-04-20 RX ORDER — MELOXICAM 15 MG/1
15 TABLET ORAL DAILY
Qty: 30 TABLET | Refills: 1 | Status: SHIPPED | OUTPATIENT
Start: 2021-04-20 | End: 2021-07-28 | Stop reason: SDUPTHER

## 2021-04-20 RX ORDER — TRIAMCINOLONE ACETONIDE 40 MG/ML
40 INJECTION, SUSPENSION INTRA-ARTICULAR; INTRAMUSCULAR ONCE
Status: COMPLETED | OUTPATIENT
Start: 2021-04-20 | End: 2021-04-20

## 2021-04-20 RX ADMIN — TRIAMCINOLONE ACETONIDE 40 MG: 40 INJECTION, SUSPENSION INTRA-ARTICULAR; INTRAMUSCULAR at 10:09

## 2021-04-20 NOTE — PROGRESS NOTES
"04/20/2021  Foot and Ankle Surgery - Established Patient/Follow-up  Provider: Dr. Maurice Pelletier DPM  Location: [facility]    Subjective:  Joe Noel is a 65 y.o. male.     Chief Complaint   Patient presents with   • Right Foot - Pain       HPI: Patient returns for exacerbated issues involving his right foot.  I have seen him in the past for similar issues.  Patient states that the symptoms have been gradually progressive with increased activity.  He notes that the discomfort to the dorsal aspect of the foot.  Symptoms are worse with long periods of standing or walking.  Symptoms do improve with rest.  He rates the pain an 8 out of 10 most days.  He is worried that the symptoms will continue to progress and cause further limitation.    No Known Allergies    Current Outpatient Medications on File Prior to Visit   Medication Sig Dispense Refill   • allopurinol (ZYLOPRIM) 300 MG tablet Take 1 tablet by mouth Daily. 90 tablet 3   • ALPRAZolam (XANAX) 1 MG tablet Take 1 tablet by mouth every night at bedtime. 30 tablet 3   • meloxicam (MOBIC) 15 MG tablet Daily.     • rosuvastatin (CRESTOR) 10 MG tablet Take 1 tablet by mouth Daily. 90 tablet 3     No current facility-administered medications on file prior to visit.       Objective   /78   Pulse 58   Ht 179.3 cm (70.6\")   Wt 83 kg (183 lb)   BMI 25.81 kg/m²     Podiatry Exam       General Appearance:  well nourished; well hydrated; no acute distress  Mental Status Exam        Judgement and Insight:  Intact       Orientation:  Oriented to time, place, and person  Cardiovascular (Bilateral)       Dorsalis Pedis Pulse (BL):  2/4       Posterior Tibialis Pulse (BL):  2/4       Capillary Filling Time (BL):  1-3 Seconds       Edema (BL):  No Edema  Dermatological Exam       Temperature:  warm to warm       Skin Elasticity:  normal skin elasticity  Neurological Exam (Bilateral)       Paresthesia (Bl):  negative       Achilles DTRs (Bl):  symmetric     "   Tinel over Tarsal Tunnel (Bl):  negative        MusculoSkeletal Exam (Bilateral)       Gait and Stance (Bl):   mildly antalgic.  Unassisted       ROM (Bl):   Right foot range of motion is somewhat limited and tender at the level of the talonavicular joint.  No aristides crepitus.       Muscle Strength (Bl):  symmetrical 5/5       Subluxed Digits (Bl):  no subluxation or laxity of joints       Dislocated Joints (Bl):  no dislocation of joints       Medial Turbicle Calc (Bl):  no pain       Gastroc soleus equinus (Bl):  Inability to dorsiflex past neutral position both straight legged and bent knee       Post tibial tendon (Bl):  no soreness noted       Peroneal Tendon (Bl):  no soreness noted    Moderate discomfort to the dorsal aspect of the talonavicular joint with mild swelling.  No gross deformity or instability.    Assessment/Plan   Diagnoses and all orders for this visit:    1. Right foot pain (Primary)  -     triamcinolone acetonide (KENALOG-40) injection 40 mg  -     meloxicam (Mobic) 15 MG tablet; Take 1 tablet by mouth Daily.  Dispense: 30 tablet; Refill: 1    2. Primary osteoarthritis of right foot      Patient presents with progressive issues involving the dorsal aspect of his right foot at the level of the talonavicular joint.  Previous imaging was reviewed showing moderate to severe degenerative changes involving the talonavicular joint.  I do feel that he is having exacerbating pain due to these issues.  I do feel that he would benefit from a steroid injection today.  We did discuss the risks and benefits.  Procedure was performed under ultrasound guidance without complication.  I have also asked that he start wearing a pair of over-the-counter arch supports on a daily basis.  We did review proper activity level and consideration of daily Mobic.  Patient is to follow-up with me in 6 weeks for reevaluation and imaging of the right foot.     Midtarsal Steroid Injection: Talonavicular joint  right    Informed consent was obtained before proceeding with injection.  The skin about the talonavicular joint of the right foot was cleansed with alcohol and anesthetized with approximately 1mL of 1% lidocaine plain.  A Betadine prep was then performed to the area in question.  Ultrasound guidance was used to evaluate and isolate the joint space.  Using an aseptic technique, a 1.5 mL solution containing 0.5 mL of 0.5% Marcaine plain, 0.5mL of 1% lidocaine plain and 0.5 mL of Kenalog was injected into the joint. After the injection, compression was applied followed by a sterile bandage.  The patient noted relief from pain and tolerated the injection well without complication.    No orders of the defined types were placed in this encounter.         Note is dictated utilizing voice recognition software. Unfortunately this leads to occasional typographical errors. I apologize in advance if the situation occurs. If questions occur please do not hesitate to call our office.

## 2021-05-18 ENCOUNTER — LAB (OUTPATIENT)
Dept: FAMILY MEDICINE CLINIC | Facility: CLINIC | Age: 66
End: 2021-05-18

## 2021-05-18 DIAGNOSIS — E78.41 ELEVATED LIPOPROTEIN(A): ICD-10-CM

## 2021-05-18 DIAGNOSIS — Z12.5 SCREENING FOR PROSTATE CANCER: Primary | ICD-10-CM

## 2021-05-18 LAB
ALBUMIN SERPL-MCNC: 4.2 G/DL (ref 3.5–5.2)
ALBUMIN/GLOB SERPL: 1.9 G/DL
ALP SERPL-CCNC: 45 U/L (ref 39–117)
ALT SERPL W P-5'-P-CCNC: 16 U/L (ref 1–41)
ANION GAP SERPL CALCULATED.3IONS-SCNC: 8.7 MMOL/L (ref 5–15)
AST SERPL-CCNC: 15 U/L (ref 1–40)
BASOPHILS # BLD AUTO: 0.03 10*3/MM3 (ref 0–0.2)
BASOPHILS NFR BLD AUTO: 0.6 % (ref 0–1.5)
BILIRUB SERPL-MCNC: 0.7 MG/DL (ref 0–1.2)
BILIRUB UR QL STRIP: NEGATIVE
BUN SERPL-MCNC: 23 MG/DL (ref 8–23)
BUN/CREAT SERPL: 19.3 (ref 7–25)
CALCIUM SPEC-SCNC: 9.2 MG/DL (ref 8.6–10.5)
CHLORIDE SERPL-SCNC: 107 MMOL/L (ref 98–107)
CHOLEST SERPL-MCNC: 140 MG/DL (ref 0–200)
CLARITY UR: CLEAR
CO2 SERPL-SCNC: 25.3 MMOL/L (ref 22–29)
COLOR UR: YELLOW
CREAT SERPL-MCNC: 1.19 MG/DL (ref 0.76–1.27)
DEPRECATED RDW RBC AUTO: 42.1 FL (ref 37–54)
EOSINOPHIL # BLD AUTO: 0.13 10*3/MM3 (ref 0–0.4)
EOSINOPHIL NFR BLD AUTO: 2.7 % (ref 0.3–6.2)
ERYTHROCYTE [DISTWIDTH] IN BLOOD BY AUTOMATED COUNT: 12.2 % (ref 12.3–15.4)
GFR SERPL CREATININE-BSD FRML MDRD: 61 ML/MIN/1.73
GLOBULIN UR ELPH-MCNC: 2.2 GM/DL
GLUCOSE SERPL-MCNC: 94 MG/DL (ref 65–99)
GLUCOSE UR STRIP-MCNC: NEGATIVE MG/DL
HCT VFR BLD AUTO: 41.5 % (ref 37.5–51)
HDLC SERPL-MCNC: 74 MG/DL (ref 40–60)
HGB BLD-MCNC: 13.6 G/DL (ref 13–17.7)
HGB UR QL STRIP.AUTO: NEGATIVE
IMM GRANULOCYTES # BLD AUTO: 0.02 10*3/MM3 (ref 0–0.05)
IMM GRANULOCYTES NFR BLD AUTO: 0.4 % (ref 0–0.5)
KETONES UR QL STRIP: NEGATIVE
LDLC SERPL CALC-MCNC: 55 MG/DL (ref 0–100)
LDLC/HDLC SERPL: 0.76 {RATIO}
LEUKOCYTE ESTERASE UR QL STRIP.AUTO: NEGATIVE
LYMPHOCYTES # BLD AUTO: 1.36 10*3/MM3 (ref 0.7–3.1)
LYMPHOCYTES NFR BLD AUTO: 27.9 % (ref 19.6–45.3)
MCH RBC QN AUTO: 31 PG (ref 26.6–33)
MCHC RBC AUTO-ENTMCNC: 32.8 G/DL (ref 31.5–35.7)
MCV RBC AUTO: 94.5 FL (ref 79–97)
MONOCYTES # BLD AUTO: 0.41 10*3/MM3 (ref 0.1–0.9)
MONOCYTES NFR BLD AUTO: 8.4 % (ref 5–12)
NEUTROPHILS NFR BLD AUTO: 2.92 10*3/MM3 (ref 1.7–7)
NEUTROPHILS NFR BLD AUTO: 60 % (ref 42.7–76)
NITRITE UR QL STRIP: NEGATIVE
NRBC BLD AUTO-RTO: 0 /100 WBC (ref 0–0.2)
PH UR STRIP.AUTO: 6 [PH] (ref 5–8)
PLATELET # BLD AUTO: 219 10*3/MM3 (ref 140–450)
PMV BLD AUTO: 9.8 FL (ref 6–12)
POTASSIUM SERPL-SCNC: 4.4 MMOL/L (ref 3.5–5.2)
PROT SERPL-MCNC: 6.4 G/DL (ref 6–8.5)
PROT UR QL STRIP: NEGATIVE
PSA SERPL-MCNC: 0.28 NG/ML (ref 0–4)
RBC # BLD AUTO: 4.39 10*6/MM3 (ref 4.14–5.8)
SODIUM SERPL-SCNC: 141 MMOL/L (ref 136–145)
SP GR UR STRIP: 1.02 (ref 1–1.03)
TRIGL SERPL-MCNC: 49 MG/DL (ref 0–150)
TSH SERPL DL<=0.05 MIU/L-ACNC: 2.54 UIU/ML (ref 0.27–4.2)
UROBILINOGEN UR QL STRIP: NORMAL
VLDLC SERPL-MCNC: 11 MG/DL (ref 5–40)
WBC # BLD AUTO: 4.87 10*3/MM3 (ref 3.4–10.8)

## 2021-05-18 PROCEDURE — 84443 ASSAY THYROID STIM HORMONE: CPT | Performed by: INTERNAL MEDICINE

## 2021-05-18 PROCEDURE — 81003 URINALYSIS AUTO W/O SCOPE: CPT | Performed by: INTERNAL MEDICINE

## 2021-05-18 PROCEDURE — 36415 COLL VENOUS BLD VENIPUNCTURE: CPT

## 2021-05-18 PROCEDURE — 85025 COMPLETE CBC W/AUTO DIFF WBC: CPT | Performed by: INTERNAL MEDICINE

## 2021-05-18 PROCEDURE — 80053 COMPREHEN METABOLIC PANEL: CPT | Performed by: INTERNAL MEDICINE

## 2021-05-18 PROCEDURE — G0103 PSA SCREENING: HCPCS | Performed by: INTERNAL MEDICINE

## 2021-05-18 PROCEDURE — 80061 LIPID PANEL: CPT | Performed by: INTERNAL MEDICINE

## 2021-05-20 ENCOUNTER — OFFICE VISIT (OUTPATIENT)
Dept: FAMILY MEDICINE CLINIC | Facility: CLINIC | Age: 66
End: 2021-05-20

## 2021-05-20 VITALS
WEIGHT: 180.6 LBS | HEART RATE: 70 BPM | HEIGHT: 69 IN | DIASTOLIC BLOOD PRESSURE: 78 MMHG | OXYGEN SATURATION: 98 % | BODY MASS INDEX: 26.75 KG/M2 | SYSTOLIC BLOOD PRESSURE: 134 MMHG | TEMPERATURE: 98 F | RESPIRATION RATE: 18 BRPM

## 2021-05-20 DIAGNOSIS — G89.29 HIP PAIN, CHRONIC, LEFT: ICD-10-CM

## 2021-05-20 DIAGNOSIS — K21.9 GASTROESOPHAGEAL REFLUX DISEASE WITHOUT ESOPHAGITIS: ICD-10-CM

## 2021-05-20 DIAGNOSIS — Z00.00 ENCOUNTER FOR GENERAL ADULT MEDICAL EXAMINATION WITHOUT ABNORMAL FINDINGS: Primary | ICD-10-CM

## 2021-05-20 DIAGNOSIS — E53.8 B12 DEFICIENCY: ICD-10-CM

## 2021-05-20 DIAGNOSIS — Z11.59 NEED FOR HEPATITIS C SCREENING TEST: ICD-10-CM

## 2021-05-20 DIAGNOSIS — M25.552 HIP PAIN, CHRONIC, LEFT: ICD-10-CM

## 2021-05-20 PROCEDURE — G0439 PPPS, SUBSEQ VISIT: HCPCS | Performed by: INTERNAL MEDICINE

## 2021-05-20 PROCEDURE — 20610 DRAIN/INJ JOINT/BURSA W/O US: CPT | Performed by: INTERNAL MEDICINE

## 2021-05-20 PROCEDURE — 1170F FXNL STATUS ASSESSED: CPT | Performed by: INTERNAL MEDICINE

## 2021-05-20 PROCEDURE — 1160F RVW MEDS BY RX/DR IN RCRD: CPT | Performed by: INTERNAL MEDICINE

## 2021-05-20 PROCEDURE — 90715 TDAP VACCINE 7 YRS/> IM: CPT | Performed by: INTERNAL MEDICINE

## 2021-05-20 PROCEDURE — 90471 IMMUNIZATION ADMIN: CPT | Performed by: INTERNAL MEDICINE

## 2021-05-20 PROCEDURE — 96372 THER/PROPH/DIAG INJ SC/IM: CPT | Performed by: INTERNAL MEDICINE

## 2021-05-20 PROCEDURE — 99213 OFFICE O/P EST LOW 20 MIN: CPT | Performed by: INTERNAL MEDICINE

## 2021-05-20 RX ORDER — METHYLPREDNISOLONE ACETATE 80 MG/ML
80 INJECTION, SUSPENSION INTRA-ARTICULAR; INTRALESIONAL; INTRAMUSCULAR; SOFT TISSUE ONCE
Status: COMPLETED | OUTPATIENT
Start: 2021-05-20 | End: 2021-05-20

## 2021-05-20 RX ORDER — CYANOCOBALAMIN 1000 UG/ML
1000 INJECTION, SOLUTION INTRAMUSCULAR; SUBCUTANEOUS
Status: SHIPPED | OUTPATIENT
Start: 2021-05-20

## 2021-05-20 RX ADMIN — METHYLPREDNISOLONE ACETATE 80 MG: 80 INJECTION, SUSPENSION INTRA-ARTICULAR; INTRALESIONAL; INTRAMUSCULAR; SOFT TISSUE at 14:41

## 2021-05-20 RX ADMIN — CYANOCOBALAMIN 1000 MCG: 1000 INJECTION, SOLUTION INTRAMUSCULAR; SUBCUTANEOUS at 14:36

## 2021-05-23 PROBLEM — M25.552 HIP PAIN, CHRONIC, LEFT: Status: ACTIVE | Noted: 2021-05-23

## 2021-05-23 PROBLEM — G89.29 HIP PAIN, CHRONIC, LEFT: Status: ACTIVE | Noted: 2021-05-23

## 2021-05-23 PROBLEM — Z11.59 NEED FOR HEPATITIS C SCREENING TEST: Status: ACTIVE | Noted: 2021-05-23

## 2021-05-23 PROBLEM — E53.8 B12 DEFICIENCY: Status: ACTIVE | Noted: 2017-08-30

## 2021-05-26 NOTE — PROGRESS NOTES
Mild t moderate arthritis- has bony island in winged area of bone- if pain continues would check bone scan

## 2021-06-01 ENCOUNTER — TELEPHONE (OUTPATIENT)
Dept: FAMILY MEDICINE CLINIC | Facility: CLINIC | Age: 66
End: 2021-06-01

## 2021-06-01 NOTE — TELEPHONE ENCOUNTER
Caller: Tommy Noel    Relationship: Self    Best call back number:[phone]     What is the best time to reach you:DID NOT SPECIFY    Who are you requesting to speak with (clinical staff, provider,  specific staff member): CLINICAL     Do you know the name of the person who called: TOMMY      What was the call regarding: MR. NOEL HAS LOST 5 LBS IN A WEEK, GO OVER XRAY RESULTS , SINCE HIS LAST VISIT, HE IS  WANTING TO GET DR. BLAKELY'S ADVICE. HE IS SCHEDULED FOR 6/23/2021      Do you require a callback: YES

## 2021-06-01 NOTE — TELEPHONE ENCOUNTER
Caller: Tommy Noel    Relationship: Self    Best call back number: [phone]    What is the best time to reach you: ANYTIME    Who are you requesting to speak with (clinical staff, provider,  specific staff member): CLINICAL STAFF    Do you know the name of the person who called: TOMMY NOEL     What was the call regarding: PATIENT SAID HE MISSED A CALL FROM THE OFFICE AROUND 1:30 BUT DID NOT LEAVE A VOICEMAIL, WANTING TO SEE WHETHER IT WAS ABOUT THIS PREVIOUS ENCOUNTER    Do you require a callback: YES

## 2021-06-03 ENCOUNTER — PATIENT MESSAGE (OUTPATIENT)
Dept: FAMILY MEDICINE CLINIC | Facility: CLINIC | Age: 66
End: 2021-06-03

## 2021-06-03 DIAGNOSIS — G89.29 HIP PAIN, CHRONIC, LEFT: Primary | ICD-10-CM

## 2021-06-03 DIAGNOSIS — M89.8X5 LYTIC BONE LESION OF HIP: ICD-10-CM

## 2021-06-03 DIAGNOSIS — M25.552 HIP PAIN, CHRONIC, LEFT: Primary | ICD-10-CM

## 2021-06-09 NOTE — TELEPHONE ENCOUNTER
From: DEEPALI MCGEE  To: Joe Noel  Sent: 6/3/2021 1:21 PM EDT  Subject: Questions    Hi this is iLsa Barrios's MA and I tried to call you twice about some questions you had. It might be easier to respond to this message and I can answer you this way.   Thanks  Lisa

## 2021-06-21 ENCOUNTER — HOSPITAL ENCOUNTER (OUTPATIENT)
Dept: NUCLEAR MEDICINE | Facility: HOSPITAL | Age: 66
Discharge: HOME | End: 2021-06-21

## 2021-06-21 DIAGNOSIS — M25.552 HIP PAIN, CHRONIC, LEFT: ICD-10-CM

## 2021-06-21 DIAGNOSIS — M89.8X5 LYTIC BONE LESION OF HIP: ICD-10-CM

## 2021-06-21 DIAGNOSIS — G89.29 HIP PAIN, CHRONIC, LEFT: ICD-10-CM

## 2021-06-21 PROCEDURE — A9503 TC99M MEDRONATE: HCPCS | Performed by: INTERNAL MEDICINE

## 2021-06-21 PROCEDURE — 78306 BONE IMAGING WHOLE BODY: CPT

## 2021-06-21 PROCEDURE — 0 TECHNETIUM MEDRONATE KIT: Performed by: INTERNAL MEDICINE

## 2021-06-21 RX ORDER — TC 99M MEDRONATE 20 MG/10ML
26.5 INJECTION, POWDER, LYOPHILIZED, FOR SOLUTION INTRAVENOUS
Status: COMPLETED | OUTPATIENT
Start: 2021-06-21 | End: 2021-06-21

## 2021-06-21 RX ADMIN — TC 99M MEDRONATE 26.5 MILLICURIE: 20 INJECTION, POWDER, LYOPHILIZED, FOR SOLUTION INTRAVENOUS at 11:15

## 2021-06-23 ENCOUNTER — OFFICE VISIT (OUTPATIENT)
Dept: FAMILY MEDICINE CLINIC | Facility: CLINIC | Age: 66
End: 2021-06-23

## 2021-06-23 VITALS
DIASTOLIC BLOOD PRESSURE: 70 MMHG | TEMPERATURE: 97.8 F | OXYGEN SATURATION: 99 % | BODY MASS INDEX: 26.66 KG/M2 | HEART RATE: 61 BPM | WEIGHT: 180 LBS | RESPIRATION RATE: 16 BRPM | HEIGHT: 69 IN | SYSTOLIC BLOOD PRESSURE: 122 MMHG

## 2021-06-23 DIAGNOSIS — G89.29 HIP PAIN, CHRONIC, LEFT: Primary | ICD-10-CM

## 2021-06-23 DIAGNOSIS — M25.552 HIP PAIN, CHRONIC, LEFT: Primary | ICD-10-CM

## 2021-06-23 PROCEDURE — 99213 OFFICE O/P EST LOW 20 MIN: CPT | Performed by: INTERNAL MEDICINE

## 2021-06-23 NOTE — PROGRESS NOTES
"Rooming Tab(CC,VS,Pt Hx,Fall Screen)  Chief Complaint   Patient presents with   • Weight Loss     fu       Subjective   Pt here for weight loss- has bone scan  And results not in yet- no further weight loss   left hip still hurting bad - on mobic and helping foot but not the hip.   very worried about cancer xray with bone lesion- so bone scan was ordered  I have reviewed and updated his medications, medical history and problem list during today's office visit.     Patient Care Team:  Niurka Barrios MD as PCP - General (Internal Medicine)    Problem List Tab  Medications Tab  Synopsis Tab  Chart Review Tab  Care Everywhere Tab  Immunizations Tab  Patient History Tab    Social History     Tobacco Use   • Smoking status: Never Smoker   • Smokeless tobacco: Never Used   Substance Use Topics   • Alcohol use: Not Currently       Review of Systems    Objective     Rooming Tab(CC,VS,Pt Hx,Fall Screen)  /70 (BP Location: Right arm, Patient Position: Sitting, Cuff Size: Adult)   Pulse 61   Temp 97.8 °F (36.6 °C) (Oral)   Resp 16   Ht 175.9 cm (69.25\")   Wt 81.6 kg (180 lb)   SpO2 99%   BMI 26.39 kg/m²     Body mass index is 26.39 kg/m².    Physical Exam  Vitals and nursing note reviewed.   Constitutional:       Appearance: Normal appearance. He is well-developed.   HENT:      Head: Normocephalic and atraumatic.      Right Ear: Tympanic membrane normal.      Left Ear: Tympanic membrane normal.      Nose: No rhinorrhea.      Mouth/Throat:      Pharynx: No posterior oropharyngeal erythema.   Eyes:      Pupils: Pupils are equal, round, and reactive to light.   Cardiovascular:      Rate and Rhythm: Normal rate and regular rhythm.      Pulses: Normal pulses.      Heart sounds: Normal heart sounds. No murmur heard.     Pulmonary:      Effort: Pulmonary effort is normal.      Breath sounds: Normal breath sounds.   Abdominal:      General: Bowel sounds are normal. There is no distension.      Palpations: Abdomen is " soft.   Musculoskeletal:         General: No tenderness.      Cervical back: Normal range of motion and neck supple.   Skin:     Capillary Refill: Capillary refill takes less than 2 seconds.   Neurological:      Mental Status: He is alert and oriented to person, place, and time.   Psychiatric:         Mood and Affect: Mood normal.         Behavior: Behavior normal.          Statin Choice Calculator  Data Reviewed:         The data below has been reviewed by Niurka Barrios MD on 06/23/2021.      Lab Results   Component Value Date    BUN 23 05/18/2021    CREATININE 1.19 05/18/2021    EGFRIFNONA 61 05/18/2021     05/18/2021    K 4.4 05/18/2021     05/18/2021    CALCIUM 9.2 05/18/2021    ALBUMIN 4.20 05/18/2021    BILITOT 0.7 05/18/2021    ALKPHOS 45 05/18/2021    AST 15 05/18/2021    ALT 16 05/18/2021    TRIG 49 05/18/2021    HDL 74 (H) 05/18/2021    VLDL 11 05/18/2021    LDL 55 05/18/2021    LDLHDL 0.76 05/18/2021    WBC 4.87 05/18/2021    RBC 4.39 05/18/2021    HCT 41.5 05/18/2021    MCV 94.5 05/18/2021    MCH 31.0 05/18/2021    TSH 2.540 05/18/2021    PSA 0.283 05/18/2021      Assessment/Plan   Order Review Tab  Health Maintenance Tab  Patient Plan/Order Tab  Diagnoses and all orders for this visit:    1. Hip pain, chronic, left (Primary)  Comments:  xray with bone lesion so got bone scan- awaiting official report but looks more like OA- will send to ortho as steroid no help  Orders:  -     Ambulatory Referral to Orthopedic Surgery    Other orders  -     Cancel: Hepatitis C Antibody        Wrapup Tab  Return if symptoms worsen or fail to improve.       They were informed of the diagnosis and treatment plan and directed to f/u for any further problems or concerns.

## 2021-06-25 ENCOUNTER — PATIENT MESSAGE (OUTPATIENT)
Dept: FAMILY MEDICINE CLINIC | Facility: CLINIC | Age: 66
End: 2021-06-25

## 2021-06-25 ENCOUNTER — TELEPHONE (OUTPATIENT)
Dept: FAMILY MEDICINE CLINIC | Facility: CLINIC | Age: 66
End: 2021-06-25

## 2021-06-25 NOTE — TELEPHONE ENCOUNTER
Caller: Joe Noel    Relationship: Self    Best call back number: [phone]      What test was performed: BONE DENSITY    When was the test performed:6/21/2021    Where was the test performed[facility]    Additional notes:     MR. NOEL SAYS HE HAD BONE DENSITY TEST DONE ON 6/21/2021, HE IS CONCERNED BECAUSE HE HAS NOT RECEIVED A CALL BACK FROM DR. BLAKELY REGARDING RESULTS, BUT RADIOLOGY HAS CALLED ASKING QUESTIONS ABOUT PRIOR IMAGING . HE WOULD LIKE A CALL BACK TODAY , IF HE IS NOT ABLE TO ANSWER LEAVE A DETAILED MESSAGE.

## 2021-06-28 ENCOUNTER — PATIENT MESSAGE (OUTPATIENT)
Dept: FAMILY MEDICINE CLINIC | Facility: CLINIC | Age: 66
End: 2021-06-28

## 2021-06-28 NOTE — TELEPHONE ENCOUNTER
From: Joe Noel  To: Niurka Kelley MD  Sent: 6/28/2021 2:09 PM EDT  Subject: Referral Request    Dr kelley mentioned an ortho referral for my hip pain still waiting to hear pain is not improving

## 2021-07-06 ENCOUNTER — OFFICE VISIT (OUTPATIENT)
Dept: ORTHOPEDIC SURGERY | Facility: CLINIC | Age: 66
End: 2021-07-06

## 2021-07-06 VITALS — BODY MASS INDEX: 26.66 KG/M2 | WEIGHT: 180 LBS | HEIGHT: 69 IN

## 2021-07-06 DIAGNOSIS — M25.552 HIP PAIN, CHRONIC, LEFT: Primary | ICD-10-CM

## 2021-07-06 DIAGNOSIS — G89.29 HIP PAIN, CHRONIC, LEFT: Primary | ICD-10-CM

## 2021-07-06 PROCEDURE — 99203 OFFICE O/P NEW LOW 30 MIN: CPT | Performed by: ORTHOPAEDIC SURGERY

## 2021-07-06 RX ORDER — TRAMADOL HYDROCHLORIDE 50 MG/1
50 TABLET ORAL NIGHTLY PRN
Qty: 30 TABLET | Refills: 0 | Status: SHIPPED | OUTPATIENT
Start: 2021-07-06 | End: 2021-12-13

## 2021-07-06 NOTE — PROGRESS NOTES
"Chief Complaint  Initial Evaluation of the Left Hip    Subjective    History of Present Illness      Joe Noel is a 66 y.o. male who presents to [facility] for bilateral hip pain and discomfort, worse on the left side.  History of Present Illness     The patient presents to the office with pain and discomfort in his bilateral hips, with the left hip being more symptomatic compared to the right. He has difficulty with ambulation and symptoms have been ongoing for the past 2 months. He has a history of chronic low back pain and there is some radiation of his pain from his low back into his left hip. He states that he finds it very difficult to ride in his car secondary to the pain and discomfort. 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 He has been receiving steroid injections in his feet, which do tend to help his symptoms to some degree. He has also tried Mobic, which is helpful in managing his symptoms. The patient states that the right hip pain is based over the greater trochanter while the left hip is more deep and is in the groin.    Pain Location: Left groin and lateral aspect of the right  hip  Radiation: none  Quality: sharp, stabbing  Intensity/Severity: moderate to severe  Duration: Approximately 6 months  Progression of symptoms: yes, progressive worsening  Onset quality: sudden  Timing: intermittent  Aggravating Factors: going up and down stairs, squatting on the ground  Alleviating Factors: NSAIDs, steroid injections in his feet  Previous Episodes: yes  Associated Symptoms: pain,  discomfort over the left SI joint  ADLs Affected: work related activities, recreational activities/sports  Previous Treatment: NSAIDs and steroid injections to his feet       Objective   Vital Signs:   Ht 175.9 cm (69.25\")   Wt 81.6 kg (180 lb)   BMI 26.39 kg/m²     Physical Exam  Physical " Exam  Vitals signs and nursing note reviewed.   Constitutional:       Appearance: Normal appearance.   Pulmonary:      Effort: Pulmonary effort is normal.   Skin:     General: Skin is warm and dry.      Capillary Refill: Capillary refill takes less than 2 seconds.   Neurological:      General: No focal deficit present.      Mental Status: He is alert and oriented to person, place, and time. Mental status is at baseline.   Psychiatric:         Mood and Affect: Mood normal.         Behavior: Behavior normal.         Thought Content: Thought content normal.         Judgment: Judgment normal.     Ortho Exam   Left SI JOINT: Mild tenderness is present dorsally over the SI joint. Figure of 4 sign is positive. There is mild spasm present in the erector spinae muscle. Flexion and extension are associated with discomfort. Deep tendon reflexes are intact and symmetrical on both lower extremities. Pain radiates into the buttock on extension of the hip. No evidence of cauda equina syndrome. No motor or sensory deficit is noted. There is no bowel or bladder involvement.  Left hip (gtb): Skin and soft tissues over the greater trochanteric bursa are tender upon palpation, along with IT band tenderness. Cross body adduction is negative. Internal and external rotations are bothersome and cause tenderness over the greater trochanter. There is no clinical deformity and no shortening. He does have a limp upon walking. There is piriformis tightness and there  is capsular tightness with any rotation. Dorsalis pedis and posterior tibial artery pulses are palpable. Neurovascular status is intact and capillary refill is less than 2 seconds. Common peroneal nerve function is well preserved.          Result Review :   The following data was reviewed by: Dragan Short MD on 07/06/2021:  Radiologic studies - see below for interpretation  left hip xrays ap/lateralviews were performed at [facility] on 05/26/2021. These images were  independently viewed and interpreted by myself, my impression as follows:    bilateral Hip X-Ray  Indication: Pain and discomfort  AP and lateral  Findings: There is fairly normal contour of the hip joint without any evidence of avascular necrosis or collapse of the femoral head.  no bony lesion  Soft tissues within normal limits  within normal limits joint spaces  Hardware appropriately positioned not applicable      no prior studies available for comparison.    X-RAY was ordered and reviewed by Dragan Short MD      A triple phase bone scan was ordered and reviewed. Those images are available in the office today. There is no evidence of skeletal metastatic disease or an occult fracture in any of the locations on the bone scan.      Procedures           Assessment   Assessment and Plan    There are no diagnoses linked to this encounter.      Follow Up   ·   · Rest, ice, compression, and elevation (RICE) therapy  · Stretching and strengthening exercises  · OTC Tylenol 500-1000mg by mouth every 6 hours as needed for pain   · Follow up in 4 week(s)  • Patient was given instructions and counseling regarding his condition or for health maintenance advice. Please see specific information pulled into the AVS if appropriate.   • Controlled substance treatment options discussed in detail. Patient's signed consent to medical options on file. BRETT form in chart.  The patient is being provided this narcotic prescription to address the acute medical condition that they are undergoing/experiencing at this time.  It is my medical and surgical assessment that more than 3 days of narcotic medication is necessary to help control the pain and discomfort that this patient is experiencing at this point.  Risks of narcotic medication usage outlined.  Possibility of physical and psychological dependence and abuse, especially long term, emphasized to the patient.  I have explained to the patient that we will try to wean them off the  narcotic medication as soon as possible and introduce non-narcotic modalities of pain control.  At this point in the patient's clinical spectrum, however, alternative available treatments are inadequate to control their pain and symptoms.  I have also discussed the possibility of random drug testing as well as pill counts to prevent misuse and misappropriation of the narcotic medications prescribed to the patient.  • At this point, I am concerned that he may have a labrum tear of the left acetabulum.   • Schedule an MR arthrogram of the left hip for evaluation of a labrum tear.   • He may take 1 tablet of 50 mg tramadol by mouth as needed at night for pain and discomfort.   • He may also take 1 tablet of 7.5 mg meloxicam by mouth twice daily as needed for pain and discomfort.   • Calcium and vitamin D for bone health. A follow up has been set up for this patient in 4 weeks for reevaluation and discussing the findings of the MR arthrogram.   • If he does have a tear of the labrum, I may have to refer him to a hip specialist who can consider and perform an arthroscopy on the hip.   • If he has evidence of avascular necrosis, then we would have to evaluate and treat the condition. We would then proceed with a possible cord decompression versus a hip arthroplasty. All of these issues have been discussed with the patient at length and he understands and accepts.    Dragan Short MD   Date of Encounter: 7/6/2021        Scribed for Dragan Short MD by Hernandez Reinoso.  07/08/21   16:54 EDT    I have personally performed the services described in this document as scribed by the above individual, and it is both accurate and complete.  Dragan Short MD  7/8/2021  17:11 EDT

## 2021-07-19 ENCOUNTER — OFFICE VISIT (OUTPATIENT)
Dept: PODIATRY | Facility: CLINIC | Age: 66
End: 2021-07-19

## 2021-07-19 VITALS
HEIGHT: 69 IN | BODY MASS INDEX: 26.57 KG/M2 | WEIGHT: 179.4 LBS | SYSTOLIC BLOOD PRESSURE: 124 MMHG | HEART RATE: 68 BPM | DIASTOLIC BLOOD PRESSURE: 83 MMHG

## 2021-07-19 DIAGNOSIS — M79.671 RIGHT FOOT PAIN: Primary | ICD-10-CM

## 2021-07-19 DIAGNOSIS — M19.071 PRIMARY OSTEOARTHRITIS OF RIGHT FOOT: ICD-10-CM

## 2021-07-19 PROCEDURE — 99213 OFFICE O/P EST LOW 20 MIN: CPT | Performed by: PODIATRIST

## 2021-07-19 PROCEDURE — 20606 DRAIN/INJ JOINT/BURSA W/US: CPT | Performed by: PODIATRIST

## 2021-07-19 RX ORDER — TRIAMCINOLONE ACETONIDE 40 MG/ML
40 INJECTION, SUSPENSION INTRA-ARTICULAR; INTRAMUSCULAR ONCE
Status: COMPLETED | OUTPATIENT
Start: 2021-07-19 | End: 2021-07-28

## 2021-07-19 NOTE — PROGRESS NOTES
"07/19/2021  Foot and Ankle Surgery - Established Patient/Follow-up  Provider: Dr. Maurice Pelletier DPM  Location: [facility]    Subjective:  Joe Noel is a 66 y.o. male.     Chief Complaint   Patient presents with   • Right Foot - Follow-up       HPI: Patient returns for follow-up on right foot pain.  He states that he responded nicely to previous steroid injection received approximately 2 months worth of relief.  Recently he has noticed exacerbating pain that has caused significant limitation.  He is also dealing with left hip pain and is following Dr. Short for this issue.  Patient would like to discuss options for his right foot today.    No Known Allergies    Current Outpatient Medications on File Prior to Visit   Medication Sig Dispense Refill   • allopurinol (ZYLOPRIM) 300 MG tablet Take 1 tablet by mouth Daily. 90 tablet 3   • ALPRAZolam (XANAX) 1 MG tablet Take 1 tablet by mouth every night at bedtime. 30 tablet 3   • meloxicam (Mobic) 15 MG tablet Take 1 tablet by mouth Daily. 30 tablet 1   • rosuvastatin (CRESTOR) 10 MG tablet Take 1 tablet by mouth Daily. (Patient taking differently: Take 5 mg by mouth Daily. Pt taking 1/2 tab daily) 90 tablet 3   • traMADol (ULTRAM) 50 MG tablet Take 1 tablet by mouth At Night As Needed for Moderate Pain . 30 tablet 0     Current Facility-Administered Medications on File Prior to Visit   Medication Dose Route Frequency Provider Last Rate Last Admin   • cyanocobalamin injection 1,000 mcg  1,000 mcg Intramuscular Q28 Days Niurka Barrois MD   1,000 mcg at 05/20/21 1436       Objective   /83   Pulse 68   Ht 175.9 cm (69.25\")   Wt 81.4 kg (179 lb 6.4 oz)   BMI 26.30 kg/m²       Podiatry Exam       General Appearance:  well nourished; well hydrated; no acute distress  Mental Status Exam        Judgement and Insight:  Intact       Orientation:  Oriented to time, place, and person  Cardiovascular (Bilateral)       Dorsalis Pedis Pulse (BL): "  2/4       Posterior Tibialis Pulse (BL):  2/4       Capillary Filling Time (BL):  1-3 Seconds       Edema (BL):  No Edema  Dermatological Exam       Temperature:  warm to warm       Skin Elasticity:  normal skin elasticity  Neurological Exam (Bilateral)       Paresthesia (Bl):  negative       Achilles DTRs (Bl):  symmetric       Tinel over Tarsal Tunnel (Bl):  negative        MusculoSkeletal Exam (Bilateral)       Gait and Stance (Bl):   mildly antalgic.  Unassisted       ROM (Bl):   Right foot range of motion is somewhat limited and tender at the level of the talonavicular joint.  No aristides crepitus.       Muscle Strength (Bl):  symmetrical 5/5       Subluxed Digits (Bl):  no subluxation or laxity of joints       Dislocated Joints (Bl):  no dislocation of joints       Medial Turbicle Calc (Bl):  no pain       Gastroc soleus equinus (Bl):  Inability to dorsiflex past neutral position both straight legged and bent knee       Post tibial tendon (Bl):  no soreness noted       Peroneal Tendon (Bl):  no soreness noted     Moderate discomfort to the dorsal aspect of the talonavicular joint with mild swelling.  No gross deformity or instability.    Assessment/Plan   Diagnoses and all orders for this visit:    1. Right foot pain (Primary)  -     XR Foot 3+ View Right    2. Primary osteoarthritis of right foot  -     triamcinolone acetonide (KENALOG-40) injection 40 mg      Patient's physical exam is unchanged.  He continues to have prominent discomfort involving the dorsal aspect of the right foot.  His symptoms remain consistent with talonavicular joint arthritis.  I did review this with him at length.  He did respond favorably to previous steroid injection.  I did recommend proceeding with repeat injection under ultrasound guidance today.  Patient understands and agrees.  We did review risks and benefits and procedure was performed without complication.  We did briefly discuss surgical options including spur removal  versus consideration for talonavicular joint arthrodesis.  Patient understands that CT will be needed before any surgery is performed.  Patient has opted to see me in 3 months for reevaluation.  I have asked that he call with any additional issues.  Greater than 20 minutes was spent before, during, and after evaluation for patient care    Talonavicular joint steroid Injection: Right    Informed consent was obtained before proceeding with injection.  The skin about the dorsal medial aspect of the talonavicular joint of the right foot was cleansed with alcohol and anesthetized with approximately 1mL of 1% lidocaine plain.  A Betadine prep was then performed to the area in question.  Ultrasound guidance was used to evaluate and isolate the joint space.  Using an aseptic technique, a 1.5 mL solution containing 0.5 mL of 0.5% Marcaine plain, 0.5mL of 1% lidocaine plain and 0.5 mL of Kenalog was injected into the joint. After the injection, compression was applied followed by a sterile bandage.  The patient noted relief from pain and tolerated the injection well without complication.      Orders Placed This Encounter   Procedures   • XR Foot 3+ View Right     Weight Bearing     Order Specific Question:   Reason for Exam:     Answer:   Right foot pain F/U xray RM 15 WB          Note is dictated utilizing voice recognition software. Unfortunately this leads to occasional typographical errors. I apologize in advance if the situation occurs. If questions occur please do not hesitate to call our office.

## 2021-07-20 ENCOUNTER — HOSPITAL ENCOUNTER (OUTPATIENT)
Dept: MRI IMAGING | Facility: HOSPITAL | Age: 66
Discharge: HOME | End: 2021-07-20

## 2021-07-20 ENCOUNTER — HOSPITAL ENCOUNTER (OUTPATIENT)
Dept: INTERVENTIONAL RADIOLOGY/VASCULAR | Facility: HOSPITAL | Age: 66
Discharge: HOME | End: 2021-07-20

## 2021-07-20 DIAGNOSIS — G89.29 HIP PAIN, CHRONIC, LEFT: ICD-10-CM

## 2021-07-20 DIAGNOSIS — M25.552 HIP PAIN, CHRONIC, LEFT: ICD-10-CM

## 2021-07-20 PROCEDURE — 73525 CONTRAST X-RAY OF HIP: CPT

## 2021-07-20 PROCEDURE — 77002 NEEDLE LOCALIZATION BY XRAY: CPT

## 2021-07-20 PROCEDURE — 25010000002 GADOTERIDOL PER 1 ML: Performed by: ORTHOPAEDIC SURGERY

## 2021-07-20 PROCEDURE — 25010000003 LIDOCAINE 1 % SOLUTION: Performed by: ORTHOPAEDIC SURGERY

## 2021-07-20 PROCEDURE — A9579 GAD-BASE MR CONTRAST NOS,1ML: HCPCS | Performed by: ORTHOPAEDIC SURGERY

## 2021-07-20 PROCEDURE — 0 IOPAMIDOL PER 1 ML: Performed by: ORTHOPAEDIC SURGERY

## 2021-07-20 PROCEDURE — 73722 MRI JOINT OF LWR EXTR W/DYE: CPT

## 2021-07-20 RX ORDER — LIDOCAINE HYDROCHLORIDE 10 MG/ML
10 INJECTION, SOLUTION INFILTRATION; PERINEURAL ONCE
Status: COMPLETED | OUTPATIENT
Start: 2021-07-20 | End: 2021-07-20

## 2021-07-20 RX ADMIN — LIDOCAINE HYDROCHLORIDE 10 ML: 10 INJECTION, SOLUTION INFILTRATION; PERINEURAL at 16:14

## 2021-07-20 RX ADMIN — GADOTERIDOL 0.1 ML: 279.3 INJECTION, SOLUTION INTRAVENOUS at 16:15

## 2021-07-20 RX ADMIN — IOPAMIDOL 10 ML: 755 INJECTION, SOLUTION INTRAVENOUS at 16:15

## 2021-07-28 DIAGNOSIS — M79.671 RIGHT FOOT PAIN: ICD-10-CM

## 2021-07-28 RX ADMIN — TRIAMCINOLONE ACETONIDE 40 MG: 40 INJECTION, SUSPENSION INTRA-ARTICULAR; INTRAMUSCULAR at 16:07

## 2021-07-29 RX ORDER — MELOXICAM 15 MG/1
15 TABLET ORAL DAILY
Qty: 30 TABLET | Refills: 1 | Status: SHIPPED | OUTPATIENT
Start: 2021-07-29 | End: 2021-12-13

## 2021-08-02 ENCOUNTER — TELEPHONE (OUTPATIENT)
Dept: ORTHOPEDIC SURGERY | Facility: CLINIC | Age: 66
End: 2021-08-02

## 2021-08-02 NOTE — TELEPHONE ENCOUNTER
Provider: CARLOS MANUEL MASTERSON  Caller: TOMMY ARRIAZA  Relationship to Patient: SELF  Phone Number: [phone]  Reason for Call: PATIENT CALLED WANTS TO KNOW WHAT HIS MRI RESULTS ARE FOR HIS LEFT HIP  When was the patient last seen: MRI WAS ON 7/6/21

## 2021-08-03 ENCOUNTER — OFFICE VISIT (OUTPATIENT)
Dept: ORTHOPEDIC SURGERY | Facility: CLINIC | Age: 66
End: 2021-08-03

## 2021-08-03 DIAGNOSIS — G89.29 HIP PAIN, CHRONIC, LEFT: Primary | ICD-10-CM

## 2021-08-03 DIAGNOSIS — M25.552 HIP PAIN, CHRONIC, LEFT: Primary | ICD-10-CM

## 2021-08-03 PROCEDURE — 99213 OFFICE O/P EST LOW 20 MIN: CPT | Performed by: ORTHOPAEDIC SURGERY

## 2021-08-03 NOTE — PROGRESS NOTES
Chief Complaint  Results of the Left Hip    Subjective    History of Present Illness      Joe Noel is a 66 y.o. male who presents to [facility] for left hip pain and discomfort.  History of Present Illness     The patient continues to have significant pain and discomfort with both his hips, left hip is more symptomatic than the right. He also has chronic low back pain. He has difficulty in going up and down the steps and turning over in the bed at night. The patient has been seen by a chiropractor and states that the intervention has helped him tremendously. He has made some improvement since the last time that we saw him. I have counseled him that he is going to need a hip arthroplasty in the short term. The patient would like to defer that form of surgical intervention as long as possible. I certainly agree with that conservative line of management. The patient has also asked me about intra-articular hip injections and we have discussed that as well. I will refer him to my partner, Dr. Martinez Robledo, for possible intra-articular injections to the hip under radiographic/ultrasound guidance.    Pain Location: LEFT hip  Radiation: none  Quality: sharp, stabbing  Intensity/Severity: varies between mild and severe  Duration: 6 months  Progression of symptoms: yes, progressive worsening  Onset quality: sudden  Timing: intermittent  Aggravating Factors: going up and down stairs, squatting  Alleviating Factors: NSAIDs  Previous Episodes: yes  Associated Symptoms: pain, swelling, clicking/popping  ADLs Affected: ambulating, work related activities  Previous Treatment: NSAIDs       Objective   Vital Signs:   There were no vitals taken for this visit.    Physical Exam  Physical Exam  Vitals signs and nursing note reviewed.   Constitutional:       Appearance: Normal appearance.   Pulmonary:      Effort: Pulmonary effort is normal.   Skin:     General: Skin is warm and dry.      Capillary  Refill: Capillary refill takes less than 2 seconds.   Neurological:      General: No focal deficit present.      Mental Status: He is alert and oriented to person, place, and time. Mental status is at baseline.   Psychiatric:         Mood and Affect: Mood normal.         Behavior: Behavior normal.         Thought Content: Thought content normal.         Judgment: Judgment normal.     Ortho Exam   Bilateral hip (gtb): Skin and soft tissues over the greater trochanteric bursa are tender upon palpation, along with IT band tenderness. Cross body adduction is negative. Internal and external rotations are bothersome and cause tenderness over the greater trochanter. There is no clinical deformity and no shortening. He does not have a limp upon walking. There is not piriformis tightness and there  is not capsular tightness with any rotation. Dorsalis pedis and posterior tibial artery pulses are palpable. Neurovascular status is intact and capillary refill is less than 2 seconds. Common peroneal nerve function is well preserved.  Bilateral hip (djd): Neurovascular status is intact. Patient does not have a limp on the affected side. Internal and external rotations are not associated with pain and discomfort. Anterior joint line pain and tenderness is significant. Stinchfield sign is positive. Figure of 4 sign is positive. Patient is unable to perform an active straight leg raise exam. Greater trochanter is tender. Crossover adduction test is positive. Cross body adduction is limited and painful for the patient. Patient has very significant limp and joint line tenderness anteriorly, posteriorly and medially. Dorsalis pedis and posterior tibial artery pulses are palpable. Common peroneal nerve function is well preserved.         Result Review :   The following data was reviewed by: Dragan Short MD on 08/03/2021:  Radiologic studies - see below for interpretation  Reviewed MRI report of left hip, performed at  [facility] on  07/20/2021, summary of impression below:      The MR arthrogram of the left hip is available today. There is a stable lesion in the wing of the ileum and the cartilage. There is a similar lesion at the head and neck junction. These changes are very similar to CT scan results from 2015. There is no evidence of a malignancy in this location. There is some cartilage narrowing of the superior lateral acetabulum. Osteophyte formation is noted at the femoral head and neck junction. The hip joint shows degenerative changes with a degenerative acetabular labrum. There are also some changes in the lumbar spine consistent with degenerative osteoarthritis.      Procedures           Assessment   Assessment and Plan    Diagnoses and all orders for this visit:    1. Hip pain, chronic, left (Primary)          Follow Up   ·   · Rest, ice, compression, and elevation (RICE) therapy  · Stretching and strengthening exercises  · OTC Tylenol 500-1000mg by mouth every 6 hours as needed for pain   · Follow up in 3 month(s)  • Patient was given instructions and counseling regarding his condition or for health maintenance advice. Please see specific information pulled into the AVS if appropriate.   • I am going to make this patient an appointment with my partner, Dr. Martinez Robledo, for an intra-articular steroid injection to the hip under ultrasound guidance.   • ANTHONY at the pain clinic for lumbar spine symptom management.   • I discussed with the patient about continuing with chiropractic care because I do think that it seems to be helping him quite a bit.   • He will eventually need a hip arthroplasty; however, we would like to treat him conservatively and nonoperatively for as long as possible.  • The patient will let me know when he is ready for that form of intervention for a hip arthroplasty  • Calcium and vitamin D for bone health. All risks and benefits of hip replacement surgery discussed with the patient at length and he understands  and accepts. He will let me know when he is ready for that form of intervention. He will follow up in 3 months.    Dragan Short MD   Date of Encounter: 8/3/2021       Transcribed from ambient dictation for Dragan Short MD by Hernandez Reinoso.  08/03/21   19:09 EDT    I have personally performed the services described in this document as transcribed by the above individual, and it is both accurate and complete.  Dragan Short MD  8/4/2021  09:20 EDT

## 2021-08-06 ENCOUNTER — OFFICE VISIT (OUTPATIENT)
Dept: ORTHOPEDIC SURGERY | Facility: CLINIC | Age: 66
End: 2021-08-06

## 2021-08-06 VITALS
HEIGHT: 69 IN | BODY MASS INDEX: 26.36 KG/M2 | DIASTOLIC BLOOD PRESSURE: 83 MMHG | SYSTOLIC BLOOD PRESSURE: 124 MMHG | WEIGHT: 178 LBS | HEART RATE: 59 BPM

## 2021-08-06 DIAGNOSIS — M24.159 DEGENERATIVE TEAR OF ACETABULAR LABRUM: ICD-10-CM

## 2021-08-06 DIAGNOSIS — M16.12 PRIMARY OSTEOARTHRITIS OF LEFT HIP: Primary | ICD-10-CM

## 2021-08-06 PROCEDURE — 20611 DRAIN/INJ JOINT/BURSA W/US: CPT | Performed by: FAMILY MEDICINE

## 2021-08-06 PROCEDURE — 99214 OFFICE O/P EST MOD 30 MIN: CPT | Performed by: FAMILY MEDICINE

## 2021-08-06 NOTE — PROGRESS NOTES
Primary Care Sports Medicine Office Visit Note     Patient ID: Joe Noel is a 66 y.o. male.    Chief Complaint:  Chief Complaint   Patient presents with   • Left Hip - Pain     HPI:    Mr. Joe Noel is a 66 y.o. male who presents to the clinic today for evaluation of left hip pain.  The patient states he has terrible pain and weakness, with any external rotation abduction. He has seen my surgical partner, Dr. Dragan Short, who has sent him to me for possible injection of the hip. He also has attributing lumbar and R foot pathology as well.  His hip is affecting near every step.  He has lateral and posterior hip and buttock pain, but mostly anterior groin pain.  He has attempted and failed most conservative measures including anti-inflammatories, and pain medication.    Past Medical History:   Diagnosis Date   • Abnormal EKG    • Allergies    • Ankle joint loose body    • Ankle pain, left    • Arthritis of right foot    • Blood in stool    • Chest pain    • Elevated LFTs    • Fatigue    • GERD (gastroesophageal reflux disease)    • Gout    • H. pylori infection    • H/O class III angina pectoris    • Hyperlipidemia    • Kidney stones    • Macular degeneration    • Malaise and fatigue    • Myalgia     and/or myositis   • Nondisplaced dome fracture of left talus, initial encounter for closed fracture    • Prostatic hypertrophy     Benign prostatic hypertrophy   • Sleep disorder        Past Surgical History:   Procedure Laterality Date   • ANKLE SURGERY      1978,1979   • CATARACT EXTRACTION, BILATERAL      implants,cataracts bilateral eyes   • OTHER SURGICAL HISTORY  2009    Stress Test       Family History   Problem Relation Age of Onset   • Colon cancer Other    • Uterine cancer Other    • Colon cancer Mother    • Alcohol abuse Father      Social History     Occupational History   • Not on file   Tobacco Use   • Smoking status: Never Smoker   • Smokeless tobacco: Never Used   Vaping Use   • Vaping Use:  "Never used   Substance and Sexual Activity   • Alcohol use: Not Currently   • Drug use: Not Currently   • Sexual activity: Defer      Review of Systems   Constitutional: Negative for activity change and fever.   Respiratory: Negative for cough and shortness of breath.    Cardiovascular: Negative for chest pain.   Gastrointestinal: Negative for constipation, diarrhea, nausea and vomiting.   Musculoskeletal: Positive for arthralgias.   Skin: Negative for color change and rash.   Neurological: Negative for weakness.   Hematological: Does not bruise/bleed easily.     Objective:    /83   Pulse 59   Ht 175.9 cm (69.25\")   Wt 80.7 kg (178 lb)   BMI 26.10 kg/m²     Physical Examination:  Physical Exam  Vitals and nursing note reviewed.   Constitutional:       General: He is not in acute distress.     Appearance: He is well-developed. He is not diaphoretic.   HENT:      Head: Normocephalic and atraumatic.   Eyes:      Conjunctiva/sclera: Conjunctivae normal.   Pulmonary:      Effort: Pulmonary effort is normal. No respiratory distress.   Musculoskeletal:      Lumbar back: Negative left straight leg raise test.   Skin:     General: Skin is warm.      Capillary Refill: Capillary refill takes less than 2 seconds.   Neurological:      Mental Status: He is alert.       Left Knee Exam     Range of Motion   The patient has normal left knee ROM.      Left Hip Exam     Tenderness   The patient is experiencing no tenderness.     Range of Motion   Abduction: normal Left hip abduction: But painful.   Adduction:  10 abnormal   Flexion: normal Left hip flexion: Obligate external rotation.   External rotation: normal   Internal rotation: 15 abnormal     Muscle Strength   The patient has normal left hip strength.     Tests   MARGAUX: negative  Fadir:  Positive FADIR test    Other   Erythema: absent  Sensation: normal  Pulse: present    Comments:  Positive log roll, positive stenchfield, strongly positive scour.      Back Exam " "    Tenderness   The patient is experiencing no tenderness.     Range of Motion   The patient has normal back ROM.    Muscle Strength   The patient has normal back strength.  Right Quadriceps:  5/5   Right Hamstrings:  5/5     Tests   Straight leg raise left: negative    Reflexes   Patellar: normal    Other   Sensation: normal  Erythema: no back redness        Imaging and other tests:  MR arthrogram of L hip dated 7/20/21 yields IMPRESSION:  1.There are benign bone islands within the pelvis. These are stable since 2015. No evidence of metastatic disease or osseous fractures or contusions.  2.There is mild arthritis of the left hip with degenerative appearance of the labrum.  3.There is degenerative disc disease and mild/moderate facet degenerative change of the lower lumbar spine.    Assessment and Plan:    1. Primary osteoarthritis of right hip    2. Degenerative tear of acetabular labrum    After discussion of risks and benefits, the patient elected to proceed with corticosteroid injection to the right true hip joint with ultrasound guidance.  The patient tolerated this procedure well without any complaints or problems.  I recommended continuation of conservative management as previous, RTC in 3-6 months or sooner if symptoms recur.      Matthew NELSON \"Chance\" Venkat STEELE DO, CAQSM  08/10/21  08:33 EDT    Disclaimer: Please note that areas of this note were completed with computer voice recognition software.  Quite often unanticipated grammatical, syntax, homophones, and other interpretive errors are inadvertently transcribed by the computer software. Please excuse any errors that have escaped final proofreading.  "

## 2021-08-10 RX ORDER — TRIAMCINOLONE ACETONIDE 40 MG/ML
80 INJECTION, SUSPENSION INTRA-ARTICULAR; INTRAMUSCULAR
Status: COMPLETED | OUTPATIENT
Start: 2021-08-10 | End: 2021-08-10

## 2021-08-10 RX ADMIN — TRIAMCINOLONE ACETONIDE 80 MG: 40 INJECTION, SUSPENSION INTRA-ARTICULAR; INTRAMUSCULAR at 08:32

## 2021-08-10 NOTE — PROGRESS NOTES
Procedure   Large Joint Arthrocentesis: L hip joint  Date/Time: 8/10/2021 8:32 AM  Consent given by: patient  Site marked: site marked  Timeout: Immediately prior to procedure a time out was called to verify the correct patient, procedure, equipment, support staff and site/side marked as required   Supporting Documentation  Indications: pain   Procedure Details  Location: hip - L hip joint  Preparation: Patient was prepped and draped in the usual sterile fashion  Needle size: 22 G (spinal)  Approach: anterior (Ultraosund guidance was used to visualize vascular structed, care was taken to avoid there structures. Please see US machine for saved images.)  Medications administered: 80 mg triamcinolone acetonide 40 MG/ML (2cc of 2% lidocaine without epinepherine and 2cc of 40mg kenalog)  Patient tolerance: patient tolerated the procedure well with no immediate complications

## 2021-10-18 ENCOUNTER — OFFICE VISIT (OUTPATIENT)
Dept: PODIATRY | Facility: CLINIC | Age: 66
End: 2021-10-18

## 2021-10-18 VITALS
DIASTOLIC BLOOD PRESSURE: 74 MMHG | BODY MASS INDEX: 26.36 KG/M2 | HEIGHT: 69 IN | WEIGHT: 178 LBS | HEART RATE: 66 BPM | SYSTOLIC BLOOD PRESSURE: 128 MMHG

## 2021-10-18 DIAGNOSIS — M79.671 RIGHT FOOT PAIN: Primary | ICD-10-CM

## 2021-10-18 DIAGNOSIS — M19.071 PRIMARY OSTEOARTHRITIS OF RIGHT FOOT: ICD-10-CM

## 2021-10-18 PROCEDURE — 20606 DRAIN/INJ JOINT/BURSA W/US: CPT | Performed by: PODIATRIST

## 2021-10-18 RX ORDER — TRIAMCINOLONE ACETONIDE 40 MG/ML
20 INJECTION, SUSPENSION INTRA-ARTICULAR; INTRAMUSCULAR ONCE
Status: COMPLETED | OUTPATIENT
Start: 2021-10-18 | End: 2021-10-18

## 2021-10-18 RX ADMIN — TRIAMCINOLONE ACETONIDE 20 MG: 40 INJECTION, SUSPENSION INTRA-ARTICULAR; INTRAMUSCULAR at 13:30

## 2021-10-19 NOTE — PROGRESS NOTES
"10/18/2021  Foot and Ankle Surgery - Established Patient/Follow-up  Provider: Dr. Maurice Pelletier DPM  Location: [facility]    Subjective:  Joe Noel is a 66 y.o. male.     Chief Complaint   Patient presents with   • Right Foot - Pain   • Follow-up     last pcp appt 1/1/2021       HPI: Patient returns for follow-up regarding his right foot.  Patient noticed substantial improvement after previous steroid injection.  He states that over the last 3 weeks, he has noticed that the symptoms are gradually increasing.  He has been able to perform normal daily activities without any significant limitations.  Denies any issues involving his left foot.    No Known Allergies    Current Outpatient Medications on File Prior to Visit   Medication Sig Dispense Refill   • allopurinol (ZYLOPRIM) 300 MG tablet Take 1 tablet by mouth Daily. 90 tablet 3   • ALPRAZolam (XANAX) 1 MG tablet Take 1 tablet by mouth every night at bedtime. 30 tablet 3   • meloxicam (Mobic) 15 MG tablet Take 1 tablet by mouth Daily. 30 tablet 1   • rosuvastatin (CRESTOR) 10 MG tablet Take 1 tablet by mouth Daily. (Patient taking differently: Take 5 mg by mouth Daily. Pt taking 1/2 tab daily) 90 tablet 3   • traMADol (ULTRAM) 50 MG tablet Take 1 tablet by mouth At Night As Needed for Moderate Pain . 30 tablet 0     Current Facility-Administered Medications on File Prior to Visit   Medication Dose Route Frequency Provider Last Rate Last Admin   • cyanocobalamin injection 1,000 mcg  1,000 mcg Intramuscular Q28 Days Niurka Barrios MD   1,000 mcg at 05/20/21 1436       Objective   /74   Pulse 66   Ht 175.3 cm (69\")   Wt 80.7 kg (178 lb)   BMI 26.29 kg/m²     Podiatry Exam       General Appearance:  well nourished; well hydrated; no acute distress  Mental Status Exam        Judgement and Insight:  Intact       Orientation:  Oriented to time, place, and person  Cardiovascular (Bilateral)       Dorsalis Pedis Pulse (BL): "  2/4       Posterior Tibialis Pulse (BL):  2/4       Capillary Filling Time (BL):  1-3 Seconds       Edema (BL):  No Edema  Dermatological Exam       Temperature:  warm to warm       Skin Elasticity:  normal skin elasticity  Neurological Exam (Bilateral)       Paresthesia (Bl):  negative       Achilles DTRs (Bl):  symmetric       Tinel over Tarsal Tunnel (Bl):  negative        MusculoSkeletal Exam (Bilateral)       Gait and Stance (Bl):   mildly antalgic.  Unassisted       ROM (Bl):   Right foot range of motion is somewhat limited and tender at the level of the talonavicular joint.  No aristides crepitus.       Muscle Strength (Bl):  symmetrical 5/5       Subluxed Digits (Bl):  no subluxation or laxity of joints       Dislocated Joints (Bl):  no dislocation of joints       Medial Turbicle Calc (Bl):  no pain       Gastroc soleus equinus (Bl):  Inability to dorsiflex past neutral position both straight legged and bent knee       Post tibial tendon (Bl):  no soreness noted       Peroneal Tendon (Bl):  no soreness noted     Moderate discomfort involving the talonavicular joint region.    Assessment/Plan   Diagnoses and all orders for this visit:    1. Right foot pain (Primary)  -     triamcinolone acetonide (KENALOG-40) injection 20 mg  -     CT foot right wo contrast; Future    2. Primary osteoarthritis of right foot      Patient's physical exam is unchanged and stable.  He continues to have pain involving the talonavicular joint region of the right foot.  No progressive issues or concerns are identified.  He did notice fairly substantial improvement after previous steroid injection.  He states that he does have quite a few things to do this winter and would like to consider repeat steroid injection if possible.  We did review imaging and briefly discuss surgical options.  Patient states that he would like to consider in the future.  We did review the risks and benefits of the injection.  Procedure was performed under  ultrasound guidance without complication.  I have asked that he continue to wear supportive shoes and monitor closely and call with any new issues or concerns.  I will see him in 3 months for reevaluation.  Patient is to obtain CT of the right foot prior to follow-up appointment.  Greater than 20 minutes was spent before, during, and after evaluation for patient care.    Talonavicular joint steroid Injection: Right     Informed consent was obtained before proceeding with injection.  The skin about the dorsal medial aspect of the talonavicular joint of the right foot was cleansed with alcohol and anesthetized with approximately 1mL of 1% lidocaine plain.  A Betadine prep was then performed to the area in question.  Ultrasound guidance was used to evaluate and isolate the joint space.  Using an aseptic technique, a 1.5 mL solution containing 0.5 mL of 0.5% Marcaine plain, 0.5mL of 1% lidocaine plain and 0.5 mL of Kenalog was injected into the joint. After the injection, compression was applied followed by a sterile bandage.  The patient noted relief from pain and tolerated the injection well without complication.    Orders Placed This Encounter   Procedures   • CT foot right wo contrast     Standing Status:   Future     Standing Expiration Date:   10/18/2022     Scheduling Instructions:      Needs to be scheduled beginning of January 2022. Has follow up appt 1/17/2022          Note is dictated utilizing voice recognition software. Unfortunately this leads to occasional typographical errors. I apologize in advance if the situation occurs. If questions occur please do not hesitate to call our office.

## 2021-10-26 RX ORDER — ALLOPURINOL 300 MG/1
300 TABLET ORAL DAILY
Qty: 90 TABLET | Refills: 3 | Status: SHIPPED | OUTPATIENT
Start: 2021-10-26 | End: 2022-10-10

## 2021-10-26 RX ORDER — ROSUVASTATIN CALCIUM 10 MG/1
10 TABLET, COATED ORAL DAILY
Qty: 90 TABLET | Refills: 3 | Status: SHIPPED | OUTPATIENT
Start: 2021-10-26 | End: 2022-10-30 | Stop reason: SDUPTHER

## 2021-11-23 ENCOUNTER — TELEPHONE (OUTPATIENT)
Dept: ORTHOPEDIC SURGERY | Facility: CLINIC | Age: 66
End: 2021-11-23

## 2021-11-23 NOTE — TELEPHONE ENCOUNTER
Provider: RADHA  Caller: TOMMY ARRIAZA  Relationship to Patient: SELF    Phone Number: [phone]  Reason for Call: PATIENT IS SCHEDULED FOR 12 15 2021 FOR NEW PROBLEM AND IS ADDED TO WAIT LIST, AS WELL.  PATIENT REQUESTED TO ASK PRACTICE IS COULD GET IN SOONER.  DR ELIAS DID HIS INJ IN AUGUST - BUT WASN'T;T SURE IF COULD SCHDULE WITH DR ELIAS, AS HAD ORIGNALLY SEEN DR MASTERSON.

## 2021-12-03 ENCOUNTER — OFFICE VISIT (OUTPATIENT)
Dept: ORTHOPEDIC SURGERY | Facility: CLINIC | Age: 66
End: 2021-12-03

## 2021-12-03 VITALS
DIASTOLIC BLOOD PRESSURE: 79 MMHG | SYSTOLIC BLOOD PRESSURE: 109 MMHG | HEIGHT: 69 IN | WEIGHT: 178 LBS | BODY MASS INDEX: 26.36 KG/M2 | HEART RATE: 72 BPM

## 2021-12-03 DIAGNOSIS — G89.29 CHRONIC LOW BACK PAIN, UNSPECIFIED BACK PAIN LATERALITY, UNSPECIFIED WHETHER SCIATICA PRESENT: ICD-10-CM

## 2021-12-03 DIAGNOSIS — M25.551 RIGHT HIP PAIN: Primary | ICD-10-CM

## 2021-12-03 DIAGNOSIS — M54.50 CHRONIC LOW BACK PAIN, UNSPECIFIED BACK PAIN LATERALITY, UNSPECIFIED WHETHER SCIATICA PRESENT: ICD-10-CM

## 2021-12-03 PROCEDURE — 99213 OFFICE O/P EST LOW 20 MIN: CPT | Performed by: ORTHOPAEDIC SURGERY

## 2021-12-03 RX ORDER — METHYLPREDNISOLONE 4 MG/1
TABLET ORAL
Qty: 21 TABLET | Refills: 1 | Status: SHIPPED | OUTPATIENT
Start: 2021-12-03 | End: 2022-03-29

## 2021-12-03 NOTE — PROGRESS NOTES
"Chief Complaint  CC: Low back pain and bilateral hip pain.    Subjective    History of Present Illness      Joe Noel is a 66 y.o. male who presents to [facility] for chronic lumbar spine pain with radiation to both hips.  History of Present Illness the patient states that he is doing a little bit better than his previous visits.  Apparently he has gone to a chiropractor who has helped him quite a bit in terms of improved mobility of the lumbar spine and the hips.  He has also been involved in massage therapy which is definitely beneficial in managing his symptoms.  He states \"I can walk without too much pain and discomfort \".  He does not have any risk factors for avascular necrosis.  He does not have a history of trauma to the hips.  The patient is more interested in trying to find out what is the source of his pain so that it can be addressed so that he can walk for exercise and resume a positive quality of life.  Pain Location: BILATERAL hip  Radiation: From the lumbar spine into the hip, the buttock and the thigh.  Quality: dull, aching  Intensity/Severity: moderate  Duration: About 2-3 months  Progression of symptoms: no worsening, symptoms stable/unchanged  Onset quality: gradual   Timing: intermittent  Aggravating Factors: kneeling, rising after sitting, squatting  Alleviating Factors: Tylenol, NSAIDs  Previous Episodes: yes  Associated Symptoms: pain, swelling  ADLs Affected: ambulating  Previous Treatment: NSAIDs       Objective   Vital Signs:   /79   Pulse 72   Ht 175.3 cm (69\")   Wt 80.7 kg (178 lb)   BMI 26.29 kg/m²     Physical Exam  Physical Exam  Vitals signs and nursing note reviewed.   Constitutional:       Appearance: Normal appearance.   Pulmonary:      Effort: Pulmonary effort is normal.   Skin:     General: Skin is warm and dry.      Capillary Refill: Capillary refill takes less than 2 seconds.   Neurological:      General: No focal deficit present. "      Mental Status: He is alert and oriented to person, place, and time. Mental status is at baseline.   Psychiatric:         Mood and Affect: Mood normal.         Behavior: Behavior normal.         Thought Content: Thought content normal.         Judgment: Judgment normal.     Ortho Exam   Bilateral SI JOINT: Mild tenderness is present dorsally over the SI joint. Figure of 4 sign is positive. There is mild spasm present in the erector spinae muscle. Flexion and extension are associated with discomfort. Deep tendon reflexes are intact and symmetrical on both lower extremities. Pain radiates into the buttock on extension of the hip. No evidence of cauda equina syndrome. No motor or sensory deficit is noted. There is no bowel or bladder involvement.  Bilateral hip (gtb): Skin and soft tissues over the greater trochanteric bursa are tender upon palpation, along with IT band tenderness. Cross body adduction is negative. Internal and external rotations are bothersome and cause tenderness over the greater trochanter. There is no clinical deformity and no shortening. He does not have a limp upon walking. There is not piriformis tightness and there  is not capsular tightness with any rotation. Dorsalis pedis and posterior tibial artery pulses are palpable. Neurovascular status is intact and capillary refill is less than 2 seconds. Common peroneal nerve function is well preserved.          Result Review :   The following data was reviewed by: Dragan Short MD on 12/03/2021:    xrays obtained today.  bilateral Hip X-Ray  Indication: Pain and discomfort in both the hips.  AP and lateral  Findings: Early degenerative change more marked on the left side compared to the right side.  no bony lesion  Soft tissues within normal limits  within normal limits joint spaces  Hardware appropriately positioned not applicable      no prior studies available for comparison.    X-RAY was ordered and reviewed by Dragan Short MD      MRI of  the left hip with arthrography is available.  These images are discussed with the patient.  There is a benign lesion in the iliac wing and the femoral head cartilage.  This is unchanged from an exam in 2015.  There is disc herniation and desiccation in the lower lumbar spine.  There are some facet changes which are also degenerative in nature.  The patient has benign bone islands in the pelvis which are stable.  There is no evidence of metastatic disease or bony fractures or contusion.  There is mild arthritis in the left hip with some degenerative change in the lumbar spine.  These images are consistent with benign changes with early degeneration and are essentially normal operative indications.          Procedures           Assessment   Assessment and Plan    Diagnoses and all orders for this visit:    1. Right hip pain (Primary)  -     XR Hip With or Without Pelvis 2 - 3 View Right  -     Ambulatory Referral to Pain Management    2. Chronic low back pain, unspecified back pain laterality, unspecified whether sciatica present  -     Ambulatory Referral to Pain Management    Other orders  -     methylPREDNISolone (MEDROL) 4 MG dose pack; Take as directed on package instructions.  Dispense: 21 tablet; Refill: 1          Follow Up   · Extensive discussion with the patient more different treatment options.  · At this point my recommendations are to proceed with nonoperative management as per the hip pathology is concerned.  · ANTHONY at the pain clinic discussed with the patient and we will set that up for him.  · Calcium and vitamin D for bone health.  · Given the patient a prescription of a Depo-Medrol Dosepak for the next 6 days to help manage the symptoms of radiculopathy.  · Tablet meloxicam 7.5 mg tab 1 p.o. twice daily for pain swelling and discomfort.  · Rest, ice, compression, and elevation (RICE) therapy  · Stretching and strengthening exercises of the quads and hamstrings.  · Intra-articular steroid injection  under ultrasound guidance discussed and offered to the patient but he declined stating he will let me know when he is ready for that form of intervention.  · OTC Tylenol 500-1000mg by mouth every 6 hours as needed for pain   · Follow up in 3 month(s)  • Patient was given instructions and counseling regarding his condition or for health maintenance advice. Please see specific information pulled into the AVS if appropriate.     Dragan Short MD   Date of Encounter: 12/3/2021   Electronically signed by Alina Roldan MA, 12/03/21, 8:36 AM EST.     MARTINE Dragon/Transcription disclaimer:  Much of this encounter note is an electronic transcription/translation of spoken language to printed text. The electronic translation of spoken language may permit erroneous, or at times, nonsensical words or phrases to be inadvertently transcribed; Although I have reviewed the note for such errors, some may still exist.

## 2021-12-06 PROBLEM — G89.29 CHRONIC LOW BACK PAIN: Status: ACTIVE | Noted: 2021-12-06

## 2021-12-06 PROBLEM — M54.50 CHRONIC LOW BACK PAIN: Status: ACTIVE | Noted: 2021-12-06

## 2021-12-06 PROBLEM — M25.551 RIGHT HIP PAIN: Status: ACTIVE | Noted: 2021-12-06

## 2021-12-13 ENCOUNTER — OFFICE VISIT (OUTPATIENT)
Dept: PAIN MEDICINE | Facility: CLINIC | Age: 66
End: 2021-12-13

## 2021-12-13 VITALS
RESPIRATION RATE: 16 BRPM | HEIGHT: 69 IN | SYSTOLIC BLOOD PRESSURE: 127 MMHG | OXYGEN SATURATION: 97 % | BODY MASS INDEX: 27.25 KG/M2 | HEART RATE: 71 BPM | WEIGHT: 184 LBS | DIASTOLIC BLOOD PRESSURE: 87 MMHG

## 2021-12-13 DIAGNOSIS — M54.16 LUMBAR RADICULITIS: ICD-10-CM

## 2021-12-13 DIAGNOSIS — G89.4 CHRONIC PAIN SYNDROME: Primary | ICD-10-CM

## 2021-12-13 DIAGNOSIS — M47.817 LUMBOSACRAL SPONDYLOSIS WITHOUT MYELOPATHY: ICD-10-CM

## 2021-12-13 PROCEDURE — 99204 OFFICE O/P NEW MOD 45 MIN: CPT | Performed by: ANESTHESIOLOGY

## 2021-12-13 NOTE — PROGRESS NOTES
Subjective    CC back pain, right leg pain  Joe Noel is a 66 y.o. male chronic polyarthralgia bilateral hip pain, chronic back pain here for initial evaluation. Referred by Ortho.   Complains of several weeks of progressively worsening back pain and right hip right leg pain described as burning tingling sharp pain worse with activity. Denies injury, saddle anesthesia, bladder bowel continence.  Acute exacerbation 3 weeks ago where he had difficulty ambulating.  He had tried chiropractor, massage therapy, home exercise program without relief.  Pain interfering with sleep and ADL.    Hip MRI showing some lumbar degenerative changes facet arthropathy.    Right x-ray  Moderately advanced osteoarthritis of the hip with some narrowing of the joint space but not yet bone-on-bone appearance    Pain Assessment   Location of Pain: Lower Back, R Hip, L Hip, right Leg,  Description of Pain: Dull/Aching, Throbbing, Stabbing  Previous Pain Rating :4  Current Pain Ratin  Aggravating Factors: Activity  Alleviating Factors: Rest, Medication    PEG Assessment   What number best describes your pain on average in the past week?4  What number best describes how, during the past week, pain has interfered with your enjoyment of life?2  What number best describes how, during the past week, pain has interfered with your general activity?  9    The following portions of the patient's history were reviewed and updated as appropriate: allergies, current medications, past family history, past medical history, past social history, past surgical history and problem list.     has a past medical history of Abnormal EKG, Allergies, Ankle joint loose body, Ankle pain, left, Arthritis, Arthritis of right foot, Blood in stool, Chest pain, Elevated LFTs, Fatigue, Foot pain, GERD (gastroesophageal reflux disease), Gout, H. pylori infection, H/O class III angina pectoris, Hip pain, Hyperlipidemia, Kidney stones, Leg pain, Low back pain,  "Macular degeneration, Malaise and fatigue, Myalgia, Nondisplaced dome fracture of left talus, initial encounter for closed fracture, Prostatic hypertrophy, and Sleep disorder.   has a past surgical history that includes Ankle surgery; Other surgical history (2009); and Cataract extraction, bilateral.  family history includes Alcohol abuse in his father; Colon cancer in his mother and another family member; Uterine cancer in an other family member.  Social History     Tobacco Use   • Smoking status: Never Smoker   • Smokeless tobacco: Never Used   Substance Use Topics   • Alcohol use: Not Currently       Review of Systems   Musculoskeletal: Positive for back pain.        Right hip pain, back pain   All other systems reviewed and are negative.      Objective   Physical Exam  Vitals reviewed.   Constitutional:       General: He is not in acute distress.  Pulmonary:      Effort: Pulmonary effort is normal.   Musculoskeletal:      Lumbar back: Tenderness present. Decreased range of motion. Positive right straight leg raise test.      Right hip: Decreased range of motion.      Comments: Positive Deon right hip,Lumbar loading positive, pain on extension of low back past 5 degrees.  TTP on the lumbar facets noted.         /87   Pulse 71   Resp 16   Ht 175.3 cm (69\")   Wt 83.5 kg (184 lb)   SpO2 97%   BMI 27.17 kg/m²      PHQ 9 on chart  Opioid risk tool low risk    Assessment/Plan   Diagnoses and all orders for this visit:    1. Chronic pain syndrome (Primary)    2. Lumbosacral spondylosis without myelopathy  -     MRI Lumbar Spine Without Contrast; Future    3. Lumbar radiculitis  -     Epidural Block  -     Epidural Block  -     MRI Lumbar Spine Without Contrast; Future    Summary  Joe Noel is a 66 y.o. male chronic polyarthralgia bilateral hip pain, chronic back pain here for initial evaluation. Referred by Ortho.    Chronic pain from lumbar DDD spondylosis with radicular pain. Chronic " polyarthralgia.    Acute exacerbation of back pain and right leg pain 3 weeks ago where he had difficulty ambulating.  He had tried chiropractor, massage therapy, home exercise program without relief.  Pain interfering with sleep and ADL.   Hip MRI showing some lumbar degenerative changes facet arthropathy.    L-spine MRI without contrast ordered for further evaluation.    LESI x2. Risk and benefit discussed.    RTC for procedure

## 2021-12-22 ENCOUNTER — PATIENT ROUNDING (BHMG ONLY) (OUTPATIENT)
Dept: PAIN MEDICINE | Facility: CLINIC | Age: 66
End: 2021-12-22

## 2022-01-03 ENCOUNTER — APPOINTMENT (OUTPATIENT)
Dept: CT IMAGING | Facility: HOSPITAL | Age: 67
End: 2022-01-03

## 2022-01-03 ENCOUNTER — TELEPHONE (OUTPATIENT)
Dept: PAIN MEDICINE | Facility: CLINIC | Age: 67
End: 2022-01-03

## 2022-01-03 NOTE — TELEPHONE ENCOUNTER
Caller: Joe Noel    Relationship to patient: Self    Best call back number: [phone]    Patient is needing: PATIENT RETURNING A CALL TO RESCHEDULE BLOCK. MRI RESCHEDULED TO 1/26/2022

## 2022-01-03 NOTE — TELEPHONE ENCOUNTER
Provider: COREEN  Caller: TOMMY ARRIAZA  Relationship to Patient: PATIENT  Pharmacy: N./A  Phone Number: [phone]  Reason for Call: PATIENT MRI WAS HELD UP AND HE NEEDS TO R/S BLOCK FOR AFTER MRI  When was the patient last seen: 12.13.21

## 2022-01-04 ENCOUNTER — APPOINTMENT (OUTPATIENT)
Dept: MRI IMAGING | Facility: HOSPITAL | Age: 67
End: 2022-01-04

## 2022-01-04 ENCOUNTER — APPOINTMENT (OUTPATIENT)
Dept: CT IMAGING | Facility: HOSPITAL | Age: 67
End: 2022-01-04

## 2022-01-07 ENCOUNTER — APPOINTMENT (OUTPATIENT)
Dept: PAIN MEDICINE | Facility: HOSPITAL | Age: 67
End: 2022-01-07

## 2022-01-24 ENCOUNTER — TELEPHONE (OUTPATIENT)
Dept: PAIN MEDICINE | Facility: CLINIC | Age: 67
End: 2022-01-24

## 2022-01-24 NOTE — TELEPHONE ENCOUNTER
Caller: TOMMY  Relationship to Patient: SELF    Phone Number: [phone]  Reason for Call: PT CALLED STATING THAT HE HAD TO MOVE HIS MRIs TO 02/16/22 HOPING TO BE ON THE OTHER END OF COVID WAVE. PT STATES THAT HE NEEDS TO R/S EPIDURALS AS WELL DUE TO HIM MOVING THE MRIs. PLEASE ADVISE AT ABOVE PHONE NUMBER

## 2022-01-26 ENCOUNTER — APPOINTMENT (OUTPATIENT)
Dept: MRI IMAGING | Facility: HOSPITAL | Age: 67
End: 2022-01-26

## 2022-01-26 ENCOUNTER — APPOINTMENT (OUTPATIENT)
Dept: CT IMAGING | Facility: HOSPITAL | Age: 67
End: 2022-01-26

## 2022-02-04 ENCOUNTER — APPOINTMENT (OUTPATIENT)
Dept: PAIN MEDICINE | Facility: HOSPITAL | Age: 67
End: 2022-02-04

## 2022-02-16 ENCOUNTER — HOSPITAL ENCOUNTER (OUTPATIENT)
Dept: MRI IMAGING | Facility: HOSPITAL | Age: 67
Discharge: HOME | End: 2022-02-16

## 2022-02-16 ENCOUNTER — HOSPITAL ENCOUNTER (OUTPATIENT)
Dept: CT IMAGING | Facility: HOSPITAL | Age: 67
Discharge: HOME | End: 2022-02-16

## 2022-02-16 DIAGNOSIS — M79.671 RIGHT FOOT PAIN: ICD-10-CM

## 2022-02-16 DIAGNOSIS — M47.817 LUMBOSACRAL SPONDYLOSIS WITHOUT MYELOPATHY: ICD-10-CM

## 2022-02-16 DIAGNOSIS — M54.16 LUMBAR RADICULITIS: ICD-10-CM

## 2022-02-16 PROCEDURE — 72148 MRI LUMBAR SPINE W/O DYE: CPT

## 2022-02-16 PROCEDURE — 73700 CT LOWER EXTREMITY W/O DYE: CPT

## 2022-02-21 ENCOUNTER — OFFICE VISIT (OUTPATIENT)
Dept: PODIATRY | Facility: CLINIC | Age: 67
End: 2022-02-21

## 2022-02-21 ENCOUNTER — HOSPITAL ENCOUNTER (OUTPATIENT)
Dept: PAIN MEDICINE | Facility: HOSPITAL | Age: 67
Discharge: HOME | End: 2022-02-21

## 2022-02-21 ENCOUNTER — TELEPHONE (OUTPATIENT)
Dept: PAIN MEDICINE | Facility: CLINIC | Age: 67
End: 2022-02-21

## 2022-02-21 VITALS
WEIGHT: 184 LBS | TEMPERATURE: 97.3 F | RESPIRATION RATE: 16 BRPM | DIASTOLIC BLOOD PRESSURE: 79 MMHG | BODY MASS INDEX: 27.25 KG/M2 | HEART RATE: 62 BPM | SYSTOLIC BLOOD PRESSURE: 112 MMHG | HEIGHT: 69 IN | OXYGEN SATURATION: 96 %

## 2022-02-21 VITALS
BODY MASS INDEX: 27.25 KG/M2 | SYSTOLIC BLOOD PRESSURE: 112 MMHG | HEIGHT: 69 IN | WEIGHT: 184 LBS | HEART RATE: 64 BPM | DIASTOLIC BLOOD PRESSURE: 78 MMHG

## 2022-02-21 DIAGNOSIS — M54.16 LUMBAR RADICULITIS: Primary | ICD-10-CM

## 2022-02-21 DIAGNOSIS — R52 PAIN: ICD-10-CM

## 2022-02-21 DIAGNOSIS — M79.671 RIGHT FOOT PAIN: Primary | ICD-10-CM

## 2022-02-21 DIAGNOSIS — M19.071 PRIMARY OSTEOARTHRITIS OF RIGHT FOOT: ICD-10-CM

## 2022-02-21 PROCEDURE — 77003 FLUOROGUIDE FOR SPINE INJECT: CPT

## 2022-02-21 PROCEDURE — 99213 OFFICE O/P EST LOW 20 MIN: CPT | Performed by: PODIATRIST

## 2022-02-21 PROCEDURE — 76942 ECHO GUIDE FOR BIOPSY: CPT | Performed by: PODIATRIST

## 2022-02-21 PROCEDURE — 20605 DRAIN/INJ JOINT/BURSA W/O US: CPT | Performed by: PODIATRIST

## 2022-02-21 PROCEDURE — 0 IOPAMIDOL 41 % SOLUTION: Performed by: ANESTHESIOLOGY

## 2022-02-21 PROCEDURE — 62323 NJX INTERLAMINAR LMBR/SAC: CPT | Performed by: ANESTHESIOLOGY

## 2022-02-21 PROCEDURE — 25010000002 METHYLPREDNISOLONE PER 40 MG: Performed by: ANESTHESIOLOGY

## 2022-02-21 RX ORDER — TRIAMCINOLONE ACETONIDE 40 MG/ML
20 INJECTION, SUSPENSION INTRA-ARTICULAR; INTRAMUSCULAR ONCE
Status: COMPLETED | OUTPATIENT
Start: 2022-02-21 | End: 2022-02-21

## 2022-02-21 RX ORDER — TRIAMCINOLONE ACETONIDE 40 MG/ML
40 INJECTION, SUSPENSION INTRA-ARTICULAR; INTRAMUSCULAR ONCE
Status: DISCONTINUED | OUTPATIENT
Start: 2022-02-21 | End: 2022-02-21

## 2022-02-21 RX ORDER — METHYLPREDNISOLONE ACETATE 40 MG/ML
40 INJECTION, SUSPENSION INTRA-ARTICULAR; INTRALESIONAL; INTRAMUSCULAR; SOFT TISSUE ONCE
Status: COMPLETED | OUTPATIENT
Start: 2022-02-21 | End: 2022-02-21

## 2022-02-21 RX ADMIN — METHYLPREDNISOLONE ACETATE 40 MG: 40 INJECTION, SUSPENSION INTRA-ARTICULAR; INTRALESIONAL; INTRAMUSCULAR; INTRASYNOVIAL; SOFT TISSUE at 09:51

## 2022-02-21 RX ADMIN — TRIAMCINOLONE ACETONIDE 20 MG: 40 INJECTION, SUSPENSION INTRA-ARTICULAR; INTRAMUSCULAR at 13:43

## 2022-02-21 RX ADMIN — IOPAMIDOL 3 ML: 408 INJECTION, SOLUTION INTRATHECAL at 09:51

## 2022-02-21 NOTE — DISCHARGE INSTRUCTIONS
EPIDURAL STEROID INJECTION          An epidural steroid injection is a shot of steroid medicine and numbing medicine that is given into the space between the spinal cord and the bones of the back (epidural space).  The injection helps relieve pain by an irritated or swollen nerve root.    TELL YOUR HEALTH CARE PROVIDER ABOUT:  • Any allergies you have  • All medicines you are taking including any over the counter medicines  • Any blood disorders you have  • Any surgeries you have had  • Any medical conditions you have  • Whether you are pregnant or may be pregnant    WHAT ARE THE RISK?  Generally, this is a safe procedure. However,problems may occur, including  • Headache  • Bleeding  • Infection  • Allergic Reaction  • Nerve Damage    WHAT CAN I EXPECT AFTER THE PROCEDURE?    INJECTION SITE  • Remove the Band-Aid/s after 24 hours  • Check your injection site every day for signs of infection.  Check for:             Redness             Bleeding (small amt is normal)             Warmth             Pus or bad odor  • Some numbness may be experienced for several hours following the procedure.  • Avoid using heat on the injection site for 24 hours. You may use ice intermittently if needed by placing a         towel between your skin and the ice bag and using the ice for 20 minutes 2-3 times a day.  • Do not take baths, swim or use a hot tub for 24 hours.    ACTIVITY  • No strenuous activity for 24 hours then return to normal activity as tolerated.  • If your leg is numb, no driving until full sensation and strength has returned.    GENERAL INSTRUCTIONS:  • The injection site may feel numb, use ice with caution if numbness is present and no heat for 24 hours or until numbness is gone.   • If you have numbness or weakness in your arm or leg, use those areas with caution until normal sensation returns.  • It is not uncommon to notice an increase in discomfort or a change in the location of discomfort for 3-4 days after  the procedure.  If discomfort is noticed at the injection site, ice may be            applied to that area for 20 min 2-3 times a day.  • Take the pain medicine your physician has prescribed or over the counter pain relievers as long as you do not have any contraindications.  • If you are a diabetic, monitor your blood sugar closely.  The steroids used in your procedure may increase your blood sugar level up to 36 hours after the injection.  If your blood sugar is greater than 250, call the physician that helps you monitor your blood sugar.  • Keep all follow-up visits as scheduled by your health care provider. This is important.    CONTACT OUR OFFICE IF:  • You have any of these signs of infection            -Redness, swelling, or warmth around your injection site.            -Fluid or blood coming from your injection site (small amt of blood is normal)            -Pus or a bad odor from your injection site            -A fever  • You develop a severe headache or a stiff neck  • You lose control of your bladder or bowel movements      PAIN MANAGEMENT CENTER HOURS   • Monday-Friday 7:30 am. - 4:00 pm.  For any problem related to your procedure we can be reached at [phone]  • If you experience an emergency with your procedure, call [phone] or go to the emergency room.

## 2022-02-21 NOTE — TELEPHONE ENCOUNTER
HUB STAFF ATTEMPTED NON-CLINICAL & CLINICAL WARM TRANSFERS - NO ANSWER     Caller: Joe Noel    Relationship: Self    Best call back number: [phone]    What was the call regarding: PATIENT ASKING IF HE NEEDS A  FOR HIS EPIDURAL THIS MORNING 02/21/22 AT 0930 - PATIENT ALSO STATED HE WOULD BE COMING TO THE [facility] PAIN MGMT OFC (PLEASE ADVISE / CORRECT IF HE WILL BE GOING TO [facility] FOR HIS PROCEDURE)     Do you require a callback: YES     THANKS

## 2022-02-21 NOTE — PROCEDURES
"Subjective    Cc back pain  Joe Noel is a 66 y.o. male with lumbar radiculitis here for LESI.  No anticoagulation    Pain Assessment   Location of Pain: Lower Back, R Hip, right leg  Description of Pain: Dull/Aching, Throbbing, Stabbing  Previous Pain Rating :7  Current Pain Ratin  Aggravating Factors: Activity  Alleviating Factors: Rest, Medication    The following portions of the patient's history were reviewed and updated as appropriate: allergies, current medications, past family history, past medical history, past social history, past surgical history and problem list.      Review of Systems  As in HPI  Objective   Physical Exam  Constitutional:       General: He is not in acute distress.  Cardiovascular:      Rate and Rhythm: Normal rate.   Pulmonary:      Effort: Pulmonary effort is normal.   Musculoskeletal:      Lumbar back: Tenderness present. Decreased range of motion.   Neurological:      Mental Status: He is alert and oriented to person, place, and time.       /81 (BP Location: Right arm, Patient Position: Sitting)   Pulse 77   Temp 97.3 °F (36.3 °C) (Skin)   Resp 16   Ht 175.3 cm (69\")   Wt 83.5 kg (184 lb)   SpO2 99%   BMI 27.17 kg/m²     Assessment/Plan    underwent LESI    RTC 4-6 weeks or as needed for repeat    DATE OF PROCEDURE: 2022    PREOPERATIVE DIAGNOSIS: lumbar DDD/radiculitis    POSTOPERATIVE DIAGNOSIS: same    PROCEDURE PERFORMED:  lumbar Epidural Steroid Injection    The patient presents with a history of   lumbar degenerative disc disease with  radiculitis. The patient presents today for a  lumbar epidural steroid injection at level  L5/S1.   The patient was seen in the preoperative area.  Patient's consent was obtained and updated.  Vitals were taken.  Patient was then brought to the procedure suite and placed in a prone position. The appropriate anatomic area was widely prepped with Chloraprep and draped in a sterile fashion. Noninvasive monitoring per " routine anesthesia protocol was placed.  Under fluoroscopic guidance using  AP view a 20 gauge styleted tuohy needle was passed through skin anesthetized with 1% Lidocaine without epinephrine.  The needle was advanced using the continuous loss of resistance to saline technique into the L5 epidural space. Needle tip placement in the epidural space was confirmed by loss of resistance and injection of 1 mL of  preservative free contrast.  Following this 8 mL of a solution containing  1 mL of 40 mg Depo-Medrol and 7 mL of preservative-free saline was carefully administered in the epidural space.  A sterile dressing was placed over the puncture site.    The patient tolerated the procedure with no complications . They were then brought to the post procedure area where they recovered nicely.

## 2022-02-22 ENCOUNTER — TELEPHONE (OUTPATIENT)
Dept: PAIN MEDICINE | Facility: HOSPITAL | Age: 67
End: 2022-02-22

## 2022-02-22 NOTE — TELEPHONE ENCOUNTER
Post op procedure call made. Spoke with patient and he reports doing well with no questions or concerns.

## 2022-02-22 NOTE — PROGRESS NOTES
"02/21/2022  Foot and Ankle Surgery - Established Patient/Follow-up  Provider: Dr. Maurice Pelletier DPM  Location: [facility]    Subjective:  Joe Noel is a 66 y.o. male.     Chief Complaint   Patient presents with   • Right Foot - Pain   • Foot Pain     last pcp with pat kelley md 6/23/2021       HPI: Patient returns for evaluation of his right foot pain.  He continues to have pain and limitation states that he did quite well after previous steroid injection.  He had plans on proceeding with surgical discussion at this time but states that he is unable to do so due to work restrictions.  He states that he needs to continue to work and would like to know if he can receive repeat steroid injection today.  He denies any new issues or concerns.  He continues to have significant discomfort with weightbearing activities that remain isolated to the anterior aspect of the ankle and dorsum of the foot.    No Known Allergies    Current Outpatient Medications on File Prior to Visit   Medication Sig Dispense Refill   • allopurinol (ZYLOPRIM) 300 MG tablet Take 1 tablet by mouth Daily. 90 tablet 3   • ALPRAZolam (XANAX) 1 MG tablet Take 1 tablet by mouth every night at bedtime. 30 tablet 3   • rosuvastatin (CRESTOR) 10 MG tablet Take 1 tablet by mouth Daily. 90 tablet 3   • methylPREDNISolone (MEDROL) 4 MG dose pack Take as directed on package instructions. 21 tablet 1     Current Facility-Administered Medications on File Prior to Visit   Medication Dose Route Frequency Provider Last Rate Last Admin   • cyanocobalamin injection 1,000 mcg  1,000 mcg Intramuscular Q28 Days Pat Kelley MD   1,000 mcg at 05/20/21 1436       Objective   /78   Pulse 64   Ht 175.3 cm (69\")   Wt 83.5 kg (184 lb)   BMI 27.17 kg/m²     Podiatry Exam       General Appearance:  well nourished; well hydrated; no acute distress  Mental Status Exam        Judgement and Insight:  Intact       Orientation:  Oriented to " time, place, and person  Cardiovascular (Bilateral)       Dorsalis Pedis Pulse (BL):  2/4       Posterior Tibialis Pulse (BL):  2/4       Capillary Filling Time (BL):  1-3 Seconds       Edema (BL):  No Edema  Dermatological Exam       Temperature:  warm to warm       Skin Elasticity:  normal skin elasticity  Neurological Exam (Bilateral)       Paresthesia (Bl):  negative       Achilles DTRs (Bl):  symmetric       Tinel over Tarsal Tunnel (Bl):  negative        MusculoSkeletal Exam (Bilateral)       Gait and Stance (Bl):   mildly antalgic.  Unassisted       ROM (Bl):   Right foot range of motion is somewhat limited and tender at the level of the talonavicular joint.  No aristides crepitus.       Muscle Strength (Bl):  symmetrical 5/5       Subluxed Digits (Bl):  no subluxation or laxity of joints       Dislocated Joints (Bl):  no dislocation of joints       Medial Turbicle Calc (Bl):  no pain       Gastroc soleus equinus (Bl):  Inability to dorsiflex past neutral position both straight legged and bent knee       Post tibial tendon (Bl):  no soreness noted       Peroneal Tendon (Bl):  no soreness noted     Moderate discomfort involving the talonavicular joint region.       Assessment/Plan   Diagnoses and all orders for this visit:    1. Right foot pain (Primary)  -     Joint Aspiration/Injection  -     Discontinue: triamcinolone acetonide (KENALOG-40) injection 40 mg  -     triamcinolone acetonide (KENALOG-40) injection 20 mg    2. Primary osteoarthritis of right foot      Patient's physical exam is unchanged and stable.  He continues to have pain with palpation involving the dorsal aspect of the talonavicular joint region.  We did review previous imaging including the CT which shows a significant osseous destruction to the talonavicular joint.  We did have plans on further discussion involving surgery but patient is unable to proceed with surgery at this time.  He has responded nicely to the steroid injections and is  requesting repeat injection.  I do feel this is appropriate.  We did review the risks and benefits.  Procedure was performed without complication.  I have asked that he continue to wear supportive shoes and monitor closely.  He is to call with any new issues or concerns and I will see him in 3 months for reevaluation.  Greater than 20 minutes was spent before, during, and after evaluation for patient care.    Talonavicular joint steroid Injection: Right     Informed consent was obtained before proceeding with injection.  The skin about the dorsal medial aspect of the talonavicular joint of the right foot was cleansed with alcohol and anesthetized with approximately 1mL of 1% lidocaine plain.  A Betadine prep was then performed to the area in question.  Ultrasound guidance was used to evaluate and isolate the joint space.  Using an aseptic technique, a 1.5 mL solution containing 0.5 mL of 0.5% Marcaine plain, 0.5mL of 1% lidocaine plain and 0.5 mL of Kenalog was injected into the joint. After the injection, compression was applied followed by a sterile bandage.  The patient noted relief from pain and tolerated the injection well without complication.    Orders Placed This Encounter   Procedures   • Joint Aspiration/Injection     Scheduling Instructions:      w/ ultrasound     Order Specific Question:   Release to patient     Answer:   Immediate          Note is dictated utilizing voice recognition software. Unfortunately this leads to occasional typographical errors. I apologize in advance if the situation occurs. If questions occur please do not hesitate to call our office.

## 2022-03-29 ENCOUNTER — OFFICE VISIT (OUTPATIENT)
Dept: PAIN MEDICINE | Facility: CLINIC | Age: 67
End: 2022-03-29

## 2022-03-29 VITALS
WEIGHT: 188 LBS | HEART RATE: 69 BPM | OXYGEN SATURATION: 97 % | RESPIRATION RATE: 16 BRPM | BODY MASS INDEX: 27.76 KG/M2 | SYSTOLIC BLOOD PRESSURE: 121 MMHG | DIASTOLIC BLOOD PRESSURE: 79 MMHG

## 2022-03-29 DIAGNOSIS — M54.16 LUMBAR RADICULITIS: ICD-10-CM

## 2022-03-29 DIAGNOSIS — G89.4 CHRONIC PAIN SYNDROME: Primary | ICD-10-CM

## 2022-03-29 DIAGNOSIS — M47.817 LUMBOSACRAL SPONDYLOSIS WITHOUT MYELOPATHY: ICD-10-CM

## 2022-03-29 PROCEDURE — 99214 OFFICE O/P EST MOD 30 MIN: CPT | Performed by: ANESTHESIOLOGY

## 2022-03-29 RX ORDER — LIFITEGRAST 50 MG/ML
SOLUTION/ DROPS OPHTHALMIC
COMMUNITY
Start: 2022-03-23

## 2022-03-29 NOTE — PROGRESS NOTES
Subjective    CC back pain, right leg pain  Joe Noel is a 66 y.o. male chronic polyarthralgia bilateral hip pain, chronic back pain here for a while.  LESI with 70% relief of right lower extremity radicular pain and right-sided back pain.  However symptoms are returning more so to the left and interfering with work.  Chronic back pain and right hip right leg pain described as burning tingling sharp pain worse with activity. Denies injury, saddle anesthesia, bladder bowel continence.  Acute exacerbation 3 weeks ago where he had difficulty ambulating.  He had tried chiropractor, massage therapy, home exercise program without relief.  Pain interfering with sleep and ADL.    L-spine MRI 2022: No acute osseous abnormality. Mild to moderate degenerative changes are present throughout the spine with a mild levoscoliotic curvature. No evidence of canal stenosis. Varying degrees of neural foraminal narrowing     Hip MRI showing some lumbar degenerative changes facet arthropathy.    Right x-ray 2021 Moderately advanced osteoarthritis of the hip with some narrowing of the joint space but not yet bone-on-bone appearance    Pain Assessment   Location of Pain: Lower Back, R Hip, L Hip, right Leg,  Description of Pain: Dull/Aching, Throbbing, Stabbing  Previous Pain Rating :4  Current Pain Rating: 3  Aggravating Factors: Activity  Alleviating Factors: Rest, Medication    PEG Assessment   What number best describes your pain on average in the past week?4  What number best describes how, during the past week, pain has interfered with your enjoyment of life?5  What number best describes how, during the past week, pain has interfered with your general activity?  7    The following portions of the patient's history were reviewed and updated as appropriate: allergies, current medications, past family history, past medical history, past social history, past surgical history and problem list.     has a past medical history of  Abnormal EKG, Allergies, Ankle joint loose body, Ankle pain, left, Arthritis, Arthritis of right foot, Blood in stool, Chest pain, Elevated LFTs, Fatigue, Foot pain, GERD (gastroesophageal reflux disease), Gout, H. pylori infection, H/O class III angina pectoris, Hip pain, Hyperlipidemia, Kidney stones, Leg pain, Low back pain, Macular degeneration, Malaise and fatigue, Myalgia, Nondisplaced dome fracture of left talus, initial encounter for closed fracture, Prostatic hypertrophy, and Sleep disorder.   has a past surgical history that includes Ankle surgery; Other surgical history (2009); and Cataract extraction, bilateral.  family history includes Alcohol abuse in his father; Colon cancer in his mother and another family member; Uterine cancer in an other family member.  Social History     Tobacco Use   • Smoking status: Never Smoker   • Smokeless tobacco: Never Used   Substance Use Topics   • Alcohol use: Not Currently       Review of Systems   Musculoskeletal: Positive for back pain.        Right hip pain, back pain   All other systems reviewed and are negative.      Objective   Physical Exam  Vitals reviewed.   Constitutional:       General: He is not in acute distress.  Pulmonary:      Effort: Pulmonary effort is normal.   Musculoskeletal:      Lumbar back: Tenderness present. Decreased range of motion. Positive right straight leg raise test.      Right hip: Decreased range of motion.      Comments: Positive Deon right hip,Lumbar loading positive, pain on extension of low back past 5 degrees.  TTP on the lumbar facets noted.         /79   Pulse 69   Resp 16   Wt 85.3 kg (188 lb)   SpO2 97%   BMI 27.76 kg/m²      PHQ 9 on chart  Opioid risk tool low risk    Assessment/Plan   Diagnoses and all orders for this visit:    1. Chronic pain syndrome (Primary)    2. Lumbosacral spondylosis without myelopathy    3. Lumbar radiculitis  -     Epidural Block    Summary  Joe Noel is a 66 y.o. male chronic  polyarthralgia bilateral hip pain, chronic back pain here for initial evaluation. Referred by Ortho.    Chronic pain from lumbar DDD spondylosis with radicular pain. Chronic polyarthralgia.    LESI with 70% relief of right lower extremity radicular pain and right-sided back pain.  However symptoms are returning more so to the left and interfering with work.  We will schedule for repeat LESI with a different approach, left parasagittal at L5-S1.  Risks and benefits discussed    RTC for procedure

## 2022-05-04 ENCOUNTER — HOSPITAL ENCOUNTER (OUTPATIENT)
Dept: PAIN MEDICINE | Facility: HOSPITAL | Age: 67
Discharge: HOME | End: 2022-05-04

## 2022-05-04 VITALS
BODY MASS INDEX: 27.85 KG/M2 | HEIGHT: 69 IN | SYSTOLIC BLOOD PRESSURE: 124 MMHG | TEMPERATURE: 97.3 F | HEART RATE: 61 BPM | OXYGEN SATURATION: 98 % | DIASTOLIC BLOOD PRESSURE: 72 MMHG | WEIGHT: 188 LBS | RESPIRATION RATE: 16 BRPM

## 2022-05-04 DIAGNOSIS — R52 PAIN: ICD-10-CM

## 2022-05-04 DIAGNOSIS — M54.16 LUMBAR RADICULITIS: ICD-10-CM

## 2022-05-04 PROCEDURE — 0 IOPAMIDOL 41 % SOLUTION: Performed by: ANESTHESIOLOGY

## 2022-05-04 PROCEDURE — 62323 NJX INTERLAMINAR LMBR/SAC: CPT | Performed by: ANESTHESIOLOGY

## 2022-05-04 PROCEDURE — 25010000002 METHYLPREDNISOLONE PER 40 MG: Performed by: ANESTHESIOLOGY

## 2022-05-04 PROCEDURE — 77003 FLUOROGUIDE FOR SPINE INJECT: CPT

## 2022-05-04 RX ORDER — METHYLPREDNISOLONE ACETATE 40 MG/ML
40 INJECTION, SUSPENSION INTRA-ARTICULAR; INTRALESIONAL; INTRAMUSCULAR; SOFT TISSUE ONCE
Status: COMPLETED | OUTPATIENT
Start: 2022-05-04 | End: 2022-05-04

## 2022-05-04 RX ADMIN — METHYLPREDNISOLONE ACETATE 40 MG: 40 INJECTION, SUSPENSION INTRA-ARTICULAR; INTRALESIONAL; INTRAMUSCULAR; INTRASYNOVIAL; SOFT TISSUE at 13:26

## 2022-05-04 RX ADMIN — IOPAMIDOL 3 ML: 408 INJECTION, SOLUTION INTRATHECAL at 13:26

## 2022-05-04 NOTE — ADDENDUM NOTE
Encounter addended by: Li Sanchez RN on: 5/4/2022 1:31 PM   Actions taken: Flowsheet accepted, Check Out activity completed

## 2022-05-04 NOTE — PROCEDURES
"Subjective    Cc back pain  Joe Noel is a 66 y.o. male with lumbar radiculitis here for repeat  LESI.  No anticoagulation    Pain Assessment   Location of Pain: Lower Back, R Hip, right leg  Description of Pain: Dull/Aching, Throbbing, Stabbing  Previous Pain Rating :7  Current Pain Ratin  Aggravating Factors: Activity  Alleviating Factors: Rest, Medication    The following portions of the patient's history were reviewed and updated as appropriate: allergies, current medications, past family history, past medical history, past social history, past surgical history and problem list.      Review of Systems  As in HPI  Objective   Physical Exam  Constitutional:       General: He is not in acute distress.  Cardiovascular:      Rate and Rhythm: Normal rate.   Pulmonary:      Effort: Pulmonary effort is normal.   Musculoskeletal:      Lumbar back: Tenderness present. Decreased range of motion.   Neurological:      Mental Status: He is alert and oriented to person, place, and time.       /79 (BP Location: Right arm, Patient Position: Sitting)   Pulse 58   Temp 97.3 °F (36.3 °C) (Skin)   Resp 16   Ht 175.3 cm (69\")   Wt 85.3 kg (188 lb)   SpO2 96%   BMI 27.76 kg/m²     Assessment/Plan    underwent repeat LESI at L4/5 left parasagittal.     RTC 4-6 weeks or as needed for repeat    DATE OF PROCEDURE: 2022    PREOPERATIVE DIAGNOSIS: lumbar DDD/radiculitis    POSTOPERATIVE DIAGNOSIS: same    PROCEDURE PERFORMED:  lumbar Epidural Steroid Injection    The patient presents with a history of   lumbar degenerative disc disease with  radiculitis. The patient presents today for a  lumbar epidural steroid injection at level  L5/S1.   The patient was seen in the preoperative area.  Patient's consent was obtained and updated.  Vitals were taken.  Patient was then brought to the procedure suite and placed in a prone position. The appropriate anatomic area was widely prepped with Chloraprep and draped in a " sterile fashion. Noninvasive monitoring per routine anesthesia protocol was placed.  Under fluoroscopic guidance using  AP view a 20 gauge styleted tuohy needle was passed through skin anesthetized with 1% Lidocaine without epinephrine.  The needle was advanced using the continuous loss of resistance to saline technique into the L5 epidural space. Needle tip placement in the epidural space was confirmed by loss of resistance and injection of 1 mL of  preservative free contrast.  Following this 8 mL of a solution containing  1 mL of 40 mg Depo-Medrol and 7 mL of preservative-free saline was carefully administered in the epidural space.  A sterile dressing was placed over the puncture site.    The patient tolerated the procedure with no complications . They were then brought to the post procedure area where they recovered nicely.

## 2022-05-04 NOTE — DISCHARGE INSTRUCTIONS

## 2022-05-05 ENCOUNTER — TELEPHONE (OUTPATIENT)
Dept: PAIN MEDICINE | Facility: HOSPITAL | Age: 67
End: 2022-05-05

## 2022-05-05 NOTE — TELEPHONE ENCOUNTER
Post procedure phone call completed.  Pt states they are doing good and denies questions or concerns.

## 2022-05-19 DIAGNOSIS — F41.9 ANXIETY: ICD-10-CM

## 2022-05-19 RX ORDER — ALPRAZOLAM 1 MG/1
1 TABLET ORAL
Qty: 30 TABLET | Refills: 3 | Status: SHIPPED | OUTPATIENT
Start: 2022-05-19 | End: 2023-01-02 | Stop reason: SDUPTHER

## 2022-06-10 ENCOUNTER — LAB (OUTPATIENT)
Dept: FAMILY MEDICINE CLINIC | Facility: CLINIC | Age: 67
End: 2022-06-10

## 2022-06-10 DIAGNOSIS — Z12.5 SCREENING FOR PROSTATE CANCER: ICD-10-CM

## 2022-06-10 DIAGNOSIS — E78.41 ELEVATED LIPOPROTEIN(A): ICD-10-CM

## 2022-06-10 DIAGNOSIS — E53.8 B12 DEFICIENCY: Primary | ICD-10-CM

## 2022-06-10 LAB
ALBUMIN SERPL-MCNC: 4.4 G/DL (ref 3.5–5.2)
ALBUMIN/GLOB SERPL: 1.7 G/DL
ALP SERPL-CCNC: 58 U/L (ref 39–117)
ALT SERPL W P-5'-P-CCNC: 15 U/L (ref 1–41)
ANION GAP SERPL CALCULATED.3IONS-SCNC: 11.7 MMOL/L (ref 5–15)
AST SERPL-CCNC: 17 U/L (ref 1–40)
BASOPHILS # BLD AUTO: 0.03 10*3/MM3 (ref 0–0.2)
BASOPHILS NFR BLD AUTO: 0.5 % (ref 0–1.5)
BILIRUB SERPL-MCNC: 0.5 MG/DL (ref 0–1.2)
BILIRUB UR QL STRIP: NEGATIVE
BUN SERPL-MCNC: 16 MG/DL (ref 8–23)
BUN/CREAT SERPL: 15.4 (ref 7–25)
CALCIUM SPEC-SCNC: 9.2 MG/DL (ref 8.6–10.5)
CHLORIDE SERPL-SCNC: 106 MMOL/L (ref 98–107)
CHOLEST SERPL-MCNC: 146 MG/DL (ref 0–200)
CLARITY UR: CLEAR
CO2 SERPL-SCNC: 23.3 MMOL/L (ref 22–29)
COLOR UR: YELLOW
CREAT SERPL-MCNC: 1.04 MG/DL (ref 0.76–1.27)
DEPRECATED RDW RBC AUTO: 41 FL (ref 37–54)
EGFRCR SERPLBLD CKD-EPI 2021: 77.7 ML/MIN/1.73
EOSINOPHIL # BLD AUTO: 0.1 10*3/MM3 (ref 0–0.4)
EOSINOPHIL NFR BLD AUTO: 1.5 % (ref 0.3–6.2)
ERYTHROCYTE [DISTWIDTH] IN BLOOD BY AUTOMATED COUNT: 12.2 % (ref 12.3–15.4)
GLOBULIN UR ELPH-MCNC: 2.6 GM/DL
GLUCOSE SERPL-MCNC: 94 MG/DL (ref 65–99)
GLUCOSE UR STRIP-MCNC: NEGATIVE MG/DL
HCT VFR BLD AUTO: 41.9 % (ref 37.5–51)
HDLC SERPL-MCNC: 71 MG/DL (ref 40–60)
HGB BLD-MCNC: 13.7 G/DL (ref 13–17.7)
HGB UR QL STRIP.AUTO: NEGATIVE
IMM GRANULOCYTES # BLD AUTO: 0.08 10*3/MM3 (ref 0–0.05)
IMM GRANULOCYTES NFR BLD AUTO: 1.2 % (ref 0–0.5)
KETONES UR QL STRIP: NEGATIVE
LDLC SERPL CALC-MCNC: 64 MG/DL (ref 0–100)
LDLC/HDLC SERPL: 0.92 {RATIO}
LEUKOCYTE ESTERASE UR QL STRIP.AUTO: NEGATIVE
LYMPHOCYTES # BLD AUTO: 1.68 10*3/MM3 (ref 0.7–3.1)
LYMPHOCYTES NFR BLD AUTO: 25.9 % (ref 19.6–45.3)
MCH RBC QN AUTO: 30 PG (ref 26.6–33)
MCHC RBC AUTO-ENTMCNC: 32.7 G/DL (ref 31.5–35.7)
MCV RBC AUTO: 91.9 FL (ref 79–97)
MONOCYTES # BLD AUTO: 0.56 10*3/MM3 (ref 0.1–0.9)
MONOCYTES NFR BLD AUTO: 8.6 % (ref 5–12)
NEUTROPHILS NFR BLD AUTO: 4.04 10*3/MM3 (ref 1.7–7)
NEUTROPHILS NFR BLD AUTO: 62.3 % (ref 42.7–76)
NITRITE UR QL STRIP: NEGATIVE
NRBC BLD AUTO-RTO: 0 /100 WBC (ref 0–0.2)
PH UR STRIP.AUTO: 5.5 [PH] (ref 5–8)
PLATELET # BLD AUTO: 219 10*3/MM3 (ref 140–450)
PMV BLD AUTO: 10.2 FL (ref 6–12)
POTASSIUM SERPL-SCNC: 4 MMOL/L (ref 3.5–5.2)
PROT SERPL-MCNC: 7 G/DL (ref 6–8.5)
PROT UR QL STRIP: NEGATIVE
PSA SERPL-MCNC: 0.36 NG/ML (ref 0–4)
RBC # BLD AUTO: 4.56 10*6/MM3 (ref 4.14–5.8)
SODIUM SERPL-SCNC: 141 MMOL/L (ref 136–145)
SP GR UR STRIP: 1.02 (ref 1–1.03)
TRIGL SERPL-MCNC: 49 MG/DL (ref 0–150)
UROBILINOGEN UR QL STRIP: NORMAL
VIT B12 BLD-MCNC: 345 PG/ML (ref 211–946)
VLDLC SERPL-MCNC: 11 MG/DL (ref 5–40)
WBC NRBC COR # BLD: 6.49 10*3/MM3 (ref 3.4–10.8)

## 2022-06-10 PROCEDURE — G0103 PSA SCREENING: HCPCS | Performed by: INTERNAL MEDICINE

## 2022-06-10 PROCEDURE — 80061 LIPID PANEL: CPT | Performed by: INTERNAL MEDICINE

## 2022-06-10 PROCEDURE — 82607 VITAMIN B-12: CPT | Performed by: INTERNAL MEDICINE

## 2022-06-10 PROCEDURE — 80053 COMPREHEN METABOLIC PANEL: CPT | Performed by: INTERNAL MEDICINE

## 2022-06-10 PROCEDURE — 36415 COLL VENOUS BLD VENIPUNCTURE: CPT

## 2022-06-10 PROCEDURE — 81003 URINALYSIS AUTO W/O SCOPE: CPT | Performed by: INTERNAL MEDICINE

## 2022-06-10 PROCEDURE — 85025 COMPLETE CBC W/AUTO DIFF WBC: CPT | Performed by: INTERNAL MEDICINE

## 2022-06-17 ENCOUNTER — OFFICE VISIT (OUTPATIENT)
Dept: FAMILY MEDICINE CLINIC | Facility: CLINIC | Age: 67
End: 2022-06-17

## 2022-06-17 VITALS
RESPIRATION RATE: 16 BRPM | DIASTOLIC BLOOD PRESSURE: 90 MMHG | WEIGHT: 183 LBS | BODY MASS INDEX: 27.11 KG/M2 | SYSTOLIC BLOOD PRESSURE: 124 MMHG | TEMPERATURE: 96.9 F | HEIGHT: 69 IN | OXYGEN SATURATION: 99 % | HEART RATE: 65 BPM

## 2022-06-17 DIAGNOSIS — E53.8 B12 DEFICIENCY: ICD-10-CM

## 2022-06-17 DIAGNOSIS — M54.50 CHRONIC LOW BACK PAIN, UNSPECIFIED BACK PAIN LATERALITY, UNSPECIFIED WHETHER SCIATICA PRESENT: ICD-10-CM

## 2022-06-17 DIAGNOSIS — G89.29 CHRONIC LOW BACK PAIN, UNSPECIFIED BACK PAIN LATERALITY, UNSPECIFIED WHETHER SCIATICA PRESENT: ICD-10-CM

## 2022-06-17 DIAGNOSIS — Z00.00 ENCOUNTER FOR GENERAL ADULT MEDICAL EXAMINATION WITHOUT ABNORMAL FINDINGS: Primary | ICD-10-CM

## 2022-06-17 PROCEDURE — 99213 OFFICE O/P EST LOW 20 MIN: CPT | Performed by: INTERNAL MEDICINE

## 2022-06-17 PROCEDURE — 96372 THER/PROPH/DIAG INJ SC/IM: CPT | Performed by: INTERNAL MEDICINE

## 2022-06-17 PROCEDURE — 1170F FXNL STATUS ASSESSED: CPT | Performed by: INTERNAL MEDICINE

## 2022-06-17 PROCEDURE — G0439 PPPS, SUBSEQ VISIT: HCPCS | Performed by: INTERNAL MEDICINE

## 2022-06-17 PROCEDURE — 1160F RVW MEDS BY RX/DR IN RCRD: CPT | Performed by: INTERNAL MEDICINE

## 2022-06-17 RX ORDER — CYANOCOBALAMIN 1000 UG/ML
1000 INJECTION, SOLUTION INTRAMUSCULAR; SUBCUTANEOUS
Status: SHIPPED | OUTPATIENT
Start: 2022-06-17

## 2022-06-17 RX ORDER — MELOXICAM 7.5 MG/1
7.5 TABLET ORAL DAILY
Qty: 90 TABLET | Refills: 1 | Status: SHIPPED | OUTPATIENT
Start: 2022-06-17

## 2022-06-17 RX ADMIN — CYANOCOBALAMIN 1000 MCG: 1000 INJECTION, SOLUTION INTRAMUSCULAR; SUBCUTANEOUS at 16:28

## 2022-06-17 NOTE — PROGRESS NOTES
The ABCs of the Annual Wellness Visit  Subsequent Medicare Wellness Visit    Chief Complaint   Patient presents with   • Medicare Wellness-subsequent      Subjective    History of Present Illness:  Joe Noel is a 67 y.o. male who presents for a Subsequent Medicare Wellness Visit.      Foot pain  The patient has reduced some of his weight since his last visit. He states he is working at [employer] and mentions that he stays active at his work. He states he remains outside on the concrete at work and is on his feet the entire time. He states he came to know he could look at his steps on his phone, and he reportedly had 21,000 steps the day before he had it looked and notes that most of his steps were painful because of his foot. He reports some of his days are worse than others. He states that he takes ibuprofen on various days, which he notes do help, but he tries not to take it every day. He states Dr. Pelletier wants to operate his foot. He states he was told that he needs to be less active for 6 to 10 weeks following the operation, and he would have his foot surgery over the winter but could not do so because of the COVID. He states his job is over in 11/2022. He states he has a scheduled appointment with Dr. Pelletier on 06/20/2022. The patient states his first 2 steroid injections lasted for about 1 or 2 months, but his last steroid injection did not last as much. He adds his current condition makes him walk funny and is causing him to have worse back and hip pain. He mentions he had epidurals in his back in the last 4 months. He states CBD gummy does help him with the pain. He is concerned of taking diclofenac or meloxicam, or Celebrex because they may thin his blood as he has a bleeding problem.    Anxiety  He reports his stress level has been about the same since COVID started, and he is still concerned about COVID. He states he does not want to be contracted with COVID. He states he  still wears masks in most of the stores. He has 2 sons, Dmitri, who is 39 years old, and Pedrito, who lives in [place]. He states he does not see Brain much, but they communicate with each other. He states he is worried a lot about Dmitri in general. The patient states Dmitri's puppy lives with them, and she is more than a year old. He is not interested in having him prescribed medications for anxiety.    Preventive care  He states he had received all 4 COVID vaccines and notes that he had his last COVID booster in 04/2022.    Allergies  He states he has allergies.    Sleeping habit  He states that he sleeps okay on half of a tablet of lorazepam other than waking up at night to urinate. He states that it keeps him from having night terrors or sleep paralysis.    Acid reflux  He denies having an issue with chest pain. He states that he has overcome his issues with acid reflux and states that it does not bother him anymore. He denies having abdominal pain.    Kidney stones  He states that he obtains pain every once in a while, but it goes away. He states that he drinks plenty of water.    Gout  He states he has not had any more issues with his gout because he takes allopurinol. He mentions that he has been on allopurinol for a long time now.    Skin bleeding  He reports his skin bleeds and wonders if there is anything he can apply on his skin. He adds it is currently spreading to his lower extremities. He states he wears protective sleeves at work to avoid bleeding. He denies using fish oil, krill oil, and vitamin E.    Macular degeneration  He states that his vision is becoming a big problem as he has macular degeneration. He reports his left eye vision is 40 percent reduced. He adds if he closes his right eye, he is unable to see things. He states he is on an eye drop for dryness and fogginess of his eyes, but it costs $2200, and he has to pay $180. He states that he has a follow-up visit next month.    Skin problem  He  locates areas where he has skin lesions. He states that he has not seen a dermatologist. He presents a skin lesion that was found last year. He states he wears a baseball hat but does not wear a big hat. He is exposed to the sun at work.      The following portions of the patient's history were reviewed and   updated as appropriate: allergies, current medications, past family history, past medical history, past social history, past surgical history and problem list.    Compared to one year ago, the patient feels his physical   health is worse.    Compared to one year ago, the patient feels his mental   health is the same.    Recent Hospitalizations:  He was not admitted to the hospital during the last year.       Current Medical Providers:  Patient Care Team:  Niurka Barrios MD as PCP - General (Internal Medicine)    Outpatient Medications Prior to Visit   Medication Sig Dispense Refill   • allopurinol (ZYLOPRIM) 300 MG tablet Take 1 tablet by mouth Daily. 90 tablet 3   • ALPRAZolam (XANAX) 1 MG tablet Take 1 tablet by mouth every night at bedtime. 30 tablet 3   • rosuvastatin (CRESTOR) 10 MG tablet Take 1 tablet by mouth Daily. 90 tablet 3   • VITAMIN D PO Take  by mouth.     • Xiidra 5 % ophthalmic solution        Facility-Administered Medications Prior to Visit   Medication Dose Route Frequency Provider Last Rate Last Admin   • cyanocobalamin injection 1,000 mcg  1,000 mcg Intramuscular Q28 Days Niurka Barrios MD   1,000 mcg at 05/20/21 1436       No opioid medication identified on active medication list. I have reviewed chart for other potential  high risk medication/s and harmful drug interactions in the elderly.          Aspirin is not on active medication list.  Aspirin use is not indicated based on review of current medical condition/s. Risk of harm outweighs potential benefits.  .    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  ACP discussion was held with the patient during this visit. Patient does not have an advance directive, information provided.          Objective    Vitals:    06/17/22 1434   BP: 124/90   BP Location: Left arm   Patient Position: Sitting   Cuff Size: Adult   Pulse: 65   Resp: 16   Temp: 96.9 °F (36.1 °C)   TempSrc: Infrared   SpO2: 99%   Weight: 83 kg (183 lb)   Height: 175.3 cm (69\")     Estimated body mass index is 27.02 kg/m² as calculated from the following:    Height as of this encounter: 175.3 cm (69\").    Weight as of this encounter: 83 kg (183 lb).    BMI is >= 25 and <30. (Overweight) The following options were offered after discussion;: exercise counseling/recommendations      Does the patient have evidence of cognitive impairment? No    Physical Exam  Vitals and nursing note reviewed.   Constitutional:       Appearance: Normal appearance. He is well-developed.   HENT:      Head: Normocephalic and atraumatic.      Right Ear: Tympanic membrane normal.      Left Ear: Tympanic membrane normal.      Nose: No rhinorrhea.      Mouth/Throat:      Pharynx: No posterior oropharyngeal erythema.   Eyes:      Pupils: Pupils are equal, round, and reactive to light.   Cardiovascular:      Rate and Rhythm: Normal rate and regular rhythm.      Pulses: Normal pulses.      Heart sounds: Normal heart sounds. " No murmur heard.  Pulmonary:      Effort: Pulmonary effort is normal.      Breath sounds: Normal breath sounds.   Abdominal:      General: Bowel sounds are normal. There is no distension.      Palpations: Abdomen is soft.   Musculoskeletal:         General: Tenderness present.      Cervical back: Normal range of motion and neck supple.   Skin:     Capillary Refill: Capillary refill takes less than 2 seconds.   Neurological:      Mental Status: He is alert and oriented to person, place, and time.   Psychiatric:         Mood and Affect: Mood normal.         Behavior: Behavior normal.       Lab Results   Component Value Date    TRIG 49 06/10/2022    HDL 71 (H) 06/10/2022    LDL 64 06/10/2022    VLDL 11 06/10/2022            HEALTH RISK ASSESSMENT    Smoking Status:  Social History     Tobacco Use   Smoking Status Never Smoker   Smokeless Tobacco Never Used     Alcohol Consumption:  Social History     Substance and Sexual Activity   Alcohol Use Not Currently     Fall Risk Screen:    STEADI Fall Risk Assessment was completed, and patient is at MODERATE risk for falls. Assessment completed on:6/17/2022    Depression Screening:  PHQ-2/PHQ-9 Depression Screening 3/29/2022   Retired PHQ-9 Total Score -   Retired Total Score -   Little Interest or Pleasure in Doing Things 0-->not at all   Feeling Down, Depressed or Hopeless 0-->not at all   PHQ-9: Brief Depression Severity Measure Score 0       Health Habits and Functional and Cognitive Screening:  Functional & Cognitive Status 6/17/2022   Do you have difficulty preparing food and eating? No   Do you have difficulty bathing yourself, getting dressed or grooming yourself? No   Do you have difficulty using the toilet? No   Do you have difficulty moving around from place to place? No   Do you have trouble with steps or getting out of a bed or a chair? No   Current Diet Well Balanced Diet   Dental Exam Not up to date   Eye Exam Up to date   Exercise (times per week) 3 times per  week   Current Exercise Activities Include -   Do you need help using the phone?  No   Are you deaf or do you have serious difficulty hearing?  No   Do you need help with transportation? No   Do you need help shopping? No   Do you need help preparing meals?  No   Do you need help with housework?  No   Do you need help with laundry? No   Do you need help taking your medications? No   Do you need help managing money? No   Do you ever drive or ride in a car without wearing a seat belt? No   Have you felt unusual stress, anger or loneliness in the last month? Yes   Who do you live with? Spouse   If you need help, do you have trouble finding someone available to you? Yes   Have you been bothered in the last four weeks by sexual problems? No   Do you have difficulty concentrating, remembering or making decisions? Yes       Age-appropriate Screening Schedule:  Refer to the list below for future screening recommendations based on patient's age, sex and/or medical conditions. Orders for these recommended tests are listed in the plan section. The patient has been provided with a written plan.    Health Maintenance   Topic Date Due   • ZOSTER VACCINE (1 of 2) 05/05/2003   • INFLUENZA VACCINE  10/01/2022   • LIPID PANEL  06/10/2023   • TDAP/TD VACCINES (2 - Td or Tdap) 05/20/2031              Assessment & Plan   CMS Preventative Services Quick Reference  Risk Factors Identified During Encounter  Cardiovascular Disease  Fall Risk-High or Moderate  The above risks/problems have been discussed with the patient.  Follow up actions/plans if indicated are seen below in the Assessment/Plan Section.  Pertinent information has been shared with the patient in the After Visit Summary.    Diagnoses and all orders for this visit:    1. Encounter for general adult medical examination without abnormal findings (Primary)  Comments:  discussed all recommendaitons     2. B12 deficiency  -     cyanocobalamin injection 1,000 mcg    3. Chronic low  back pain, unspecified back pain laterality, unspecified whether sciatica present    Other orders  -     meloxicam (Mobic) 7.5 MG tablet; Take 1 tablet by mouth Daily.  Dispense: 90 tablet; Refill: 1  -     aspirin (aspirin) 81 MG EC tablet; Take 1 tablet by mouth Daily.  Dispense: 30 tablet; Refill: 0        1. Low back pain with foot pain  - I will prescribe meloxicam 7.5 mg daily.  - I advised the patient to not take ibuprofen when he is on meloxicam.    2. Vitamin B12 deficiency  - We will administer a B12 injection today.      Follow Up:   Return in about 1 year (around 6/17/2023), or if symptoms worsen or fail to improve, for Medicare Wellness.     An After Visit Summary and PPPS were made available to the patient.    During this visit for their annual exam, we reviewed their personal history, social history and family history.  We went over their medications and all the recommended health maintenence items for their age group. They were given the opportunity to ask questions and discuss other concerns.    \plain         Transcribed from ambient dictation for Niurka Barrios MD by JONATAN DAWKINS.  06/17/22   18:22 EDT    Patient verbalized consent to the visit recording.

## 2022-06-20 ENCOUNTER — OFFICE VISIT (OUTPATIENT)
Dept: PODIATRY | Facility: CLINIC | Age: 67
End: 2022-06-20

## 2022-06-20 VITALS
HEIGHT: 69 IN | WEIGHT: 183 LBS | BODY MASS INDEX: 27.11 KG/M2 | OXYGEN SATURATION: 100 % | DIASTOLIC BLOOD PRESSURE: 71 MMHG | HEART RATE: 64 BPM | SYSTOLIC BLOOD PRESSURE: 134 MMHG

## 2022-06-20 DIAGNOSIS — M79.671 RIGHT FOOT PAIN: Primary | ICD-10-CM

## 2022-06-20 DIAGNOSIS — M19.071 PRIMARY OSTEOARTHRITIS OF RIGHT FOOT: ICD-10-CM

## 2022-06-20 PROBLEM — I20.9 ANGINA, CLASS III: Status: RESOLVED | Noted: 2018-09-27 | Resolved: 2022-06-20

## 2022-06-20 PROCEDURE — 20606 DRAIN/INJ JOINT/BURSA W/US: CPT | Performed by: PODIATRIST

## 2022-06-20 PROCEDURE — 99213 OFFICE O/P EST LOW 20 MIN: CPT | Performed by: PODIATRIST

## 2022-06-20 RX ORDER — ASPIRIN 81 MG/1
81 TABLET ORAL DAILY
Qty: 30 TABLET | Refills: 0
Start: 2022-06-20 | End: 2023-03-23

## 2022-06-20 NOTE — PROGRESS NOTES
"06/20/2022  Foot and Ankle Surgery - Established Patient/Follow-up  Provider: Dr. Maurice Pelletier DPM  Location: [facility]    Subjective:  Joe Noel is a 67 y.o. male.     Chief Complaint   Patient presents with   • Right Foot - Follow-up, Pain     Dr Jordan 6-17-22       HPI:     The patient is a 67-year-old male who returns for follow-up regarding his right foot. He was last seen in 02/2022.     He received an injection in 02/2022 that provided approximately 2 months of symptom relief. He reports that the injection did not work as well as the other injection. He states that the pain is \"driving him nuts.\" The reports that the pain is the worst at the sinus tarsi region and as he is on his foot, the pain starts to radiate. He notes that it is now affecting his back. He states that he is hoping to proceed with another injection today and then consider surgery in the fall.     Additionally, the patient reports that he has retired; however, he is a  for necessary income. He states that he is on concrete for the entire shift.     No Known Allergies    Current Outpatient Medications on File Prior to Visit   Medication Sig Dispense Refill   • allopurinol (ZYLOPRIM) 300 MG tablet Take 1 tablet by mouth Daily. 90 tablet 3   • ALPRAZolam (XANAX) 1 MG tablet Take 1 tablet by mouth every night at bedtime. 30 tablet 3   • meloxicam (Mobic) 7.5 MG tablet Take 1 tablet by mouth Daily. 90 tablet 1   • rosuvastatin (CRESTOR) 10 MG tablet Take 1 tablet by mouth Daily. 90 tablet 3   • VITAMIN D PO Take  by mouth.     • Xiidra 5 % ophthalmic solution      • aspirin (aspirin) 81 MG EC tablet Take 1 tablet by mouth Daily. 30 tablet 0     Current Facility-Administered Medications on File Prior to Visit   Medication Dose Route Frequency Provider Last Rate Last Admin   • cyanocobalamin injection 1,000 mcg  1,000 mcg Intramuscular Q28 Days Niurka Barrios MD   1,000 mcg at 05/20/21 1436   • " "cyanocobalamin injection 1,000 mcg  1,000 mcg Intramuscular Q28 Days Niurka Barrios MD   1,000 mcg at 06/17/22 1628       Objective   /71   Pulse 64   Ht 175.3 cm (69\")   Wt 83 kg (183 lb)   SpO2 100%   BMI 27.02 kg/m²     Foot/Ankle Exam:       General:   Appearance: appears stated age and healthy    Orientation: AAOx3    Affect: appropriate    Gait: antalgic      VASCULAR      Right Foot Vascularity   Normal vascular exam    Dorsalis pedis:  2+  Posterior tibial:  2+  Skin Temperature: warm    Edema Grading:  None  CFT:  < 3 seconds  Pedal Hair Growth:  Present  Varicosities: none       Left Foot Vascularity   Normal vascular exam    Dorsalis pedis:  2+  Posterior tibial:  2+  Skin Temperature: warm    Edema Grading:  None  CFT:  < 3 seconds  Pedal Hair Growth:  Present  Varicosities: none        NEUROLOGIC     Right Foot Neurologic   Light touch sensation:  Normal  Hot/Cold sensation: normal    Achilles reflex:  2+     Left Foot Neurologic   Light touch sensation:  Normal  Hot/cold sensation: normal    Achilles reflex:  2+     MUSCULOSKELETAL      Right Foot Musculoskeletal   Ecchymosis:  None  Tenderness: navicular and dorsal foot    Arch:  Normal     Left Foot Musculoskeletal   Ecchymosis:  None  Arch:  Normal     MUSCLE STRENGTH     Right Foot Muscle Strength   Normal strength    Foot dorsiflexion:  5  Foot plantar flexion:  5  Foot inversion:  5  Foot eversion:  5     Left Foot Muscle Strength   Normal strength    Foot dorsiflexion:  5  Foot plantar flexion:  5  Foot inversion:  5  Foot eversion:  5     DERMATOLOGIC     Right Foot Dermatologic   Skin: skin intact    Nails comment:  Nails 1-5     Left Foot Dermatologic   Skin: skin intact    Nails comment:  Nails 1-5     TESTS     Right Foot Tests   Anterior drawer: negative    Varus tilt: negative       Left Foot Tests   Anterior drawer: negative    Varus tilt: negative        Right Foot Additional Comments Moderate rigidity involving the " midfoot and rear foot.  Discomfort with palpation at the level of the talonavicular joint.      Assessment & Plan   Diagnoses and all orders for this visit:    1. Right foot pain (Primary)    2. Primary osteoarthritis of right foot      Patient returns for continued pain involving his right foot.  Patient noticed approximately 2 months worth of relief after previous steroid injection but he has noticed gradual return of pain.  He states that he has been quite active at work.  He denies any new issues and would like to discuss further options.  He states that he cannot proceed with surgery until later this year.  I explained that the only consideration would be repeat steroid injection given that he has responded or decreased overall activity.  Patient would like to proceed with repeat injection at this time.  Procedure was reviewed and discussed with patient.  Procedure was performed without complication under ultrasound guidance.  I have asked that he monitor his symptoms closely and return in 3 months for reevaluation and further surgical discussion.  I did explain that if surgery is performed that we may need to consider a double arthrodesis.  Greater than 20 minutes was spent before, during, and after evaluation for patient care.    Talonavicular joint steroid Injection: Right     Informed consent was obtained before proceeding with injection.  The skin about the dorsal medial aspect of the talonavicular joint of the right foot was cleansed with alcohol and anesthetized with approximately 1mL of 1% lidocaine plain.  A Betadine prep was then performed to the area in question.  Ultrasound guidance was used to evaluate and isolate the joint space.  Using an aseptic technique, a 1.5 mL solution containing 0.5 mL of 0.5% Marcaine plain, 0.5mL of 1% lidocaine plain and 0.5 mL of Kenalog was injected into the joint. After the injection, compression was applied followed by a sterile bandage.  The patient noted relief  from pain and tolerated the injection well without complication.    No orders of the defined types were placed in this encounter.         Note is dictated utilizing voice recognition software. Unfortunately this leads to occasional typographical errors. I apologize in advance if the situation occurs. If questions occur please do not hesitate to call our office.    Transcribed from ambient dictation for LUIS Pelletier DPM by Bee Sparks.  06/20/22   12:40 EDT    Patient verbalized consent to the visit recording.

## 2022-07-01 RX ORDER — TRIAMCINOLONE ACETONIDE 40 MG/ML
20 INJECTION, SUSPENSION INTRA-ARTICULAR; INTRAMUSCULAR ONCE
Status: COMPLETED | OUTPATIENT
Start: 2022-07-01 | End: 2022-07-01

## 2022-07-01 RX ADMIN — TRIAMCINOLONE ACETONIDE 20 MG: 40 INJECTION, SUSPENSION INTRA-ARTICULAR; INTRAMUSCULAR at 07:24

## 2022-09-21 ENCOUNTER — OFFICE VISIT (OUTPATIENT)
Dept: PODIATRY | Facility: CLINIC | Age: 67
End: 2022-09-21

## 2022-09-21 VITALS — BODY MASS INDEX: 27.11 KG/M2 | HEART RATE: 66 BPM | WEIGHT: 183 LBS | OXYGEN SATURATION: 97 % | HEIGHT: 69 IN

## 2022-09-21 DIAGNOSIS — M19.071 PRIMARY OSTEOARTHRITIS OF RIGHT FOOT: ICD-10-CM

## 2022-09-21 DIAGNOSIS — M79.671 RIGHT FOOT PAIN: Primary | ICD-10-CM

## 2022-09-21 PROCEDURE — 20605 DRAIN/INJ JOINT/BURSA W/O US: CPT | Performed by: PODIATRIST

## 2022-09-21 RX ORDER — TRIAMCINOLONE ACETONIDE 40 MG/ML
20 INJECTION, SUSPENSION INTRA-ARTICULAR; INTRAMUSCULAR ONCE
Status: COMPLETED | OUTPATIENT
Start: 2022-09-21 | End: 2022-09-21

## 2022-09-21 RX ADMIN — TRIAMCINOLONE ACETONIDE 20 MG: 40 INJECTION, SUSPENSION INTRA-ARTICULAR; INTRAMUSCULAR at 10:39

## 2022-09-21 NOTE — PROGRESS NOTES
09/21/2022    Talonavicular joint Steroid Injection: Right foot.    Consent and time out was performed before proceeding with the procedure.  The skin about the lateral aspect of the talonavicular joint region was cleansed with alcohol and anesthetized with approximately 1mL of 1% lidocaine plain.  A Betadine prep was then performed to the area in question.  Ultrasound guidance was used to evaluate and isolate the joint space.  Using an aseptic technique, a 1.5 mL solution containing 0.5 mL of 0.5% Marcaine plain, 0.5mL of 1% lidocaine plain and 0.5 mL of Kenalog was injected into the joint. After the injection, compression was applied followed by a sterile bandage.  The patient noted relief from pain and tolerated the injection well without complication.                                  Transcribed from ambient dictation for LUIS Pelletier DPM by Lexus Potter.  09/21/22   14:08 EDT    Patient verbalized consent to the visit recording.  I have personally performed the services described in this document as transcribed by the above individual, and it is both accurate and complete.  LUIS Pelletier DPM  9/22/2022  07:52 EDT

## 2022-09-21 NOTE — PROGRESS NOTES
09/21/2022  Foot and Ankle Surgery - Established Patient/Follow-up  Provider: Dr. Maurice Pelletier DPM  Location: [facility]    Subjective:  Joe Noel is a 67 y.o. male.     Chief Complaint   Patient presents with   • Right Foot - Follow-up   • Follow-up     ALMAZ PARRA 6/17/2022       HPI:     The patient presents to the office today for a follow-up regarding his right foot. The patient was last seen in 06/2022 and received an injection at that time.     He states the injection was more effective than the last 2 injections and lasted longer. He states the injection is wearing off and his foot is starting to hurt. He states his head space was to get one more to get him through his work season and think about the surgery in 12/2022 if that is any kind of possibility. He states he needs to finish his work season. He localizes where he had his last injection.    He notes he thought he had an x-ray in 02/2022.    No Known Allergies    Current Outpatient Medications on File Prior to Visit   Medication Sig Dispense Refill   • allopurinol (ZYLOPRIM) 300 MG tablet Take 1 tablet by mouth Daily. 90 tablet 3   • ALPRAZolam (XANAX) 1 MG tablet Take 1 tablet by mouth every night at bedtime. 30 tablet 3   • aspirin (aspirin) 81 MG EC tablet Take 1 tablet by mouth Daily. 30 tablet 0   • meloxicam (Mobic) 7.5 MG tablet Take 1 tablet by mouth Daily. 90 tablet 1   • rosuvastatin (CRESTOR) 10 MG tablet Take 1 tablet by mouth Daily. 90 tablet 3   • VITAMIN D PO Take  by mouth.     • Xiidra 5 % ophthalmic solution        Current Facility-Administered Medications on File Prior to Visit   Medication Dose Route Frequency Provider Last Rate Last Admin   • cyanocobalamin injection 1,000 mcg  1,000 mcg Intramuscular Q28 Days Niurka Barrios MD   1,000 mcg at 05/20/21 1436   • cyanocobalamin injection 1,000 mcg  1,000 mcg Intramuscular Q28 Days Niurka Barrios MD   1,000 mcg at 06/17/22 1628       Objective   Pulse 66  "  Ht 175.3 cm (69\")   Wt 83 kg (183 lb)   SpO2 97%   BMI 27.02 kg/m²     Foot/Ankle Exam:       General:   Appearance: appears stated age and healthy    Orientation: AAOx3    Affect: appropriate    Gait: antalgic      VASCULAR      Right Foot Vascularity   Normal vascular exam    Dorsalis pedis:  2+  Posterior tibial:  2+  Skin Temperature: warm    Edema Grading:  None  CFT:  < 3 seconds  Pedal Hair Growth:  Present  Varicosities: none       Left Foot Vascularity   Normal vascular exam    Dorsalis pedis:  2+  Posterior tibial:  2+  Skin Temperature: warm    Edema Grading:  None  CFT:  < 3 seconds  Pedal Hair Growth:  Present  Varicosities: none        NEUROLOGIC     Right Foot Neurologic   Light touch sensation:  Normal  Hot/Cold sensation: normal    Achilles reflex:  2+     Left Foot Neurologic   Light touch sensation:  Normal  Hot/cold sensation: normal    Achilles reflex:  2+     MUSCULOSKELETAL      Right Foot Musculoskeletal   Ecchymosis:  None  Tenderness: navicular and dorsal foot    Arch:  Normal     Left Foot Musculoskeletal   Ecchymosis:  None  Arch:  Normal     MUSCLE STRENGTH     Right Foot Muscle Strength   Normal strength    Foot dorsiflexion:  5  Foot plantar flexion:  5  Foot inversion:  5  Foot eversion:  5     Left Foot Muscle Strength   Normal strength    Foot dorsiflexion:  5  Foot plantar flexion:  5  Foot inversion:  5  Foot eversion:  5     DERMATOLOGIC     Right Foot Dermatologic   Skin: skin intact    Nails comment:  Nails 1-5     Left Foot Dermatologic   Skin: skin intact    Nails comment:  Nails 1-5     TESTS     Right Foot Tests   Anterior drawer: negative    Varus tilt: negative       Left Foot Tests   Anterior drawer: negative    Varus tilt: negative        Right Foot Additional Comments Moderate rigidity involving the midfoot and rear foot.  Discomfort with palpation at the level of the talonavicular joint.      Assessment & Plan   Diagnoses and all orders for this visit:    1. " Right foot pain (Primary)  -     triamcinolone acetonide (KENALOG-40) injection 20 mg  -     XR Foot 3+ View Right    2. Primary osteoarthritis of right foot      Right foot pain.  Discussed that we could possibly do another steroid injection and schedule out the surgery for 12/2022 or 01/023. Patient has been advised to get another x-ray today due to last one being in 07/2021. He had a CT done in 02/2022. Will attempt to do the steroid injection on the lateral aspect of the joint. Suggest to make an appointment late 11/2022 or early 13/2022 to get him on schedule before the 1st of the 2023.    Orders Placed This Encounter   Procedures   • XR Foot 3+ View Right     Order Specific Question:   Reason for Exam:     Answer:   right foot pain across top of foot  mroom 13  wb  may leave after xray     Order Specific Question:   Does this patient have a diabetic monitoring/medication delivering device on?     Answer:   No     Order Specific Question:   Release to patient     Answer:   Routine Release          Note is dictated utilizing voice recognition software. Unfortunately this leads to occasional typographical errors. I apologize in advance if the situation occurs. If questions occur please do not hesitate to call our office.    Transcribed from ambient dictation for LUIS Pelletier DPM by Lexus Potter.  09/21/22   14:07 EDT    Patient verbalized consent to the visit recording.  I have personally performed the services described in this document as transcribed by the above individual, and it is both accurate and complete.  LUIS Pelletier DPM  9/22/2022  07:53 EDT

## 2022-10-10 RX ORDER — ALLOPURINOL 300 MG/1
TABLET ORAL
Qty: 90 TABLET | Refills: 3 | Status: SHIPPED | OUTPATIENT
Start: 2022-10-10

## 2022-10-31 RX ORDER — ROSUVASTATIN CALCIUM 10 MG/1
10 TABLET, COATED ORAL DAILY
Qty: 90 TABLET | Refills: 3 | Status: SHIPPED | OUTPATIENT
Start: 2022-10-31

## 2022-11-21 ENCOUNTER — OFFICE VISIT (OUTPATIENT)
Dept: PODIATRY | Facility: CLINIC | Age: 67
End: 2022-11-21

## 2022-11-21 VITALS — HEIGHT: 69 IN | BODY MASS INDEX: 27.11 KG/M2 | OXYGEN SATURATION: 98 % | HEART RATE: 75 BPM | WEIGHT: 183 LBS

## 2022-11-21 DIAGNOSIS — M79.671 RIGHT FOOT PAIN: Primary | ICD-10-CM

## 2022-11-21 DIAGNOSIS — M19.071 PRIMARY OSTEOARTHRITIS OF RIGHT FOOT: ICD-10-CM

## 2022-11-21 PROCEDURE — 99213 OFFICE O/P EST LOW 20 MIN: CPT | Performed by: PODIATRIST

## 2022-11-21 NOTE — PROGRESS NOTES
"11/21/2022  Foot and Ankle Surgery - Established Patient/Follow-up  Provider: Dr. Maurice Pelletier DPM  Location: [facility]    Subjective:  Joe Noel is a 67 y.o. male.     Chief Complaint   Patient presents with   • Right Foot - Pain, Osteoarthritis   • Follow-up     ALMAZ Barrios MD, 06/17/2022       HPI: The patient is a 67-year-old male who returns for follow-up regarding his right foot. He is accompanied by an adult female.    He reports that he has been considering surgical intervention. He states that when he initially presented to the clinic, he was experiencing pain that was \" life altering \". He explains that now the pain is just an \"aggravation.\" He states that he has been wearing insoles in his tennis shoes.       No Known Allergies    Current Outpatient Medications on File Prior to Visit   Medication Sig Dispense Refill   • allopurinol (ZYLOPRIM) 300 MG tablet TAKE 1 TABLET DAILY 90 tablet 3   • ALPRAZolam (XANAX) 1 MG tablet Take 1 tablet by mouth every night at bedtime. 30 tablet 3   • aspirin (aspirin) 81 MG EC tablet Take 1 tablet by mouth Daily. 30 tablet 0   • meloxicam (Mobic) 7.5 MG tablet Take 1 tablet by mouth Daily. 90 tablet 1   • rosuvastatin (CRESTOR) 10 MG tablet Take 1 tablet by mouth Daily. 90 tablet 3   • VITAMIN D PO Take  by mouth.     • Xiidra 5 % ophthalmic solution        Current Facility-Administered Medications on File Prior to Visit   Medication Dose Route Frequency Provider Last Rate Last Admin   • cyanocobalamin injection 1,000 mcg  1,000 mcg Intramuscular Q28 Days Niurka Barrios MD   1,000 mcg at 05/20/21 1436   • cyanocobalamin injection 1,000 mcg  1,000 mcg Intramuscular Q28 Days Niurka Barrios MD   1,000 mcg at 06/17/22 1628       Objective   Pulse 75   Ht 175.3 cm (69\")   Wt 83 kg (183 lb)   SpO2 98%   BMI 27.02 kg/m²     Foot/Ankle Exam     General:   Appearance: appears stated age and healthy    Orientation: AAOx3    Affect: " appropriate    Gait: antalgic       VASCULAR       Right Foot Vascularity   Normal vascular exam    Dorsalis pedis:  2+  Posterior tibial:  2+  Skin Temperature: warm    Edema Grading:  None  CFT:  < 3 seconds  Pedal Hair Growth:  Present  Varicosities: none        Left Foot Vascularity   Normal vascular exam    Dorsalis pedis:  2+  Posterior tibial:  2+  Skin Temperature: warm    Edema Grading:  None  CFT:  < 3 seconds  Pedal Hair Growth:  Present  Varicosities: none        NEUROLOGIC      Right Foot Neurologic   Light touch sensation:  Normal  Hot/Cold sensation: normal    Achilles reflex:  2+      Left Foot Neurologic   Light touch sensation:  Normal  Hot/cold sensation: normal    Achilles reflex:  2+      MUSCULOSKELETAL       Right Foot Musculoskeletal   Ecchymosis:  None  Tenderness: navicular and dorsal foot    Arch:  Normal      Left Foot Musculoskeletal   Ecchymosis:  None  Arch:  Normal      MUSCLE STRENGTH      Right Foot Muscle Strength   Normal strength    Foot dorsiflexion:  5  Foot plantar flexion:  5  Foot inversion:  5  Foot eversion:  5      Left Foot Muscle Strength   Normal strength    Foot dorsiflexion:  5  Foot plantar flexion:  5  Foot inversion:  5  Foot eversion:  5      DERMATOLOGIC      Right Foot Dermatologic   Skin: skin intact    Nails comment:  Nails 1-5      Left Foot Dermatologic   Skin: skin intact    Nails comment:  Nails 1-5      TESTS      Right Foot Tests   Anterior drawer: negative    Varus tilt: negative        Left Foot Tests   Anterior drawer: negative    Varus tilt: negative      Right Foot Additional Comments Moderate rigidity involving the midfoot and rear foot.  Discomfort with palpation at the level of the talonavicular joint.       Assessment & Plan   Diagnoses and all orders for this visit:    1. Right foot pain (Primary)    2. Primary osteoarthritis of right foot        The patient returns for follow-up regarding his right foot. Discussed with the patient that we can  repeat the injection every 4 months if he can tolerate it. Advised him to wear supportive shoes and that any aggressive activity will make his symptoms worse. He will return to the clinic in 4 months for re-evaluation.     Greater than 20 minutes spent before, during and after evaluation for patient care.    No orders of the defined types were placed in this encounter.         Note is dictated utilizing voice recognition software. Unfortunately this leads to occasional typographical errors. I apologize in advance if the situation occurs. If questions occur please do not hesitate to call our office.    Transcribed from ambient dictation for LUIS Pelletier DPM by Cassy Boyd.  11/21/22   13:59 EST    Patient or patient representative verbalized consent to the visit recording.  I have personally performed the services described in this document as transcribed by the above individual, and it is both accurate and complete.

## 2023-01-02 DIAGNOSIS — F41.9 ANXIETY: ICD-10-CM

## 2023-01-03 RX ORDER — ALPRAZOLAM 1 MG/1
1 TABLET ORAL
Qty: 30 TABLET | Refills: 3 | Status: SHIPPED | OUTPATIENT
Start: 2023-01-03

## 2023-01-25 PROBLEM — Z86.010 PERSONAL HISTORY OF COLONIC POLYPS: Status: ACTIVE | Noted: 2018-03-16

## 2023-02-02 ENCOUNTER — TELEPHONE (OUTPATIENT)
Dept: ORTHOPEDIC SURGERY | Facility: CLINIC | Age: 68
End: 2023-02-02
Payer: MEDICARE

## 2023-02-02 NOTE — TELEPHONE ENCOUNTER
Caller: TOMMY   Relationship to Patient: SELF  Phone Number: [phone]  Reason for Call: PATIENT CALLING TO GET RIGHT FOOT INJECTION, APPT ON SCHEDULE FOR 03/07 BUT STATES NO INJECTION

## 2023-02-02 NOTE — TELEPHONE ENCOUNTER
CALLED PATIENT, UPDATED 03/07/2023 APPOINTMENT TO REFLECT RIGHT FOOT INJECTION. PATIENT LAST HAD ONE DONE IN SEPT.

## 2023-03-07 ENCOUNTER — OFFICE VISIT (OUTPATIENT)
Dept: PODIATRY | Facility: CLINIC | Age: 68
End: 2023-03-07
Payer: MEDICARE

## 2023-03-07 VITALS — HEIGHT: 69 IN | OXYGEN SATURATION: 97 % | BODY MASS INDEX: 27.11 KG/M2 | WEIGHT: 183 LBS

## 2023-03-07 DIAGNOSIS — M19.071 PRIMARY OSTEOARTHRITIS OF RIGHT FOOT: ICD-10-CM

## 2023-03-07 DIAGNOSIS — M21.861 ACQUIRED POSTERIOR EQUINUS, RIGHT: ICD-10-CM

## 2023-03-07 DIAGNOSIS — M79.671 RIGHT FOOT PAIN: Primary | ICD-10-CM

## 2023-03-07 PROCEDURE — 1160F RVW MEDS BY RX/DR IN RCRD: CPT | Performed by: PODIATRIST

## 2023-03-07 PROCEDURE — 20606 DRAIN/INJ JOINT/BURSA W/US: CPT | Performed by: PODIATRIST

## 2023-03-07 PROCEDURE — 1159F MED LIST DOCD IN RCRD: CPT | Performed by: PODIATRIST

## 2023-03-07 PROCEDURE — 99213 OFFICE O/P EST LOW 20 MIN: CPT | Performed by: PODIATRIST

## 2023-03-07 RX ORDER — TRIAMCINOLONE ACETONIDE 40 MG/ML
20 INJECTION, SUSPENSION INTRA-ARTICULAR; INTRAMUSCULAR ONCE
Status: COMPLETED | OUTPATIENT
Start: 2023-03-07 | End: 2023-03-07

## 2023-03-07 RX ADMIN — TRIAMCINOLONE ACETONIDE 20 MG: 40 INJECTION, SUSPENSION INTRA-ARTICULAR; INTRAMUSCULAR at 11:21

## 2023-03-07 NOTE — PROGRESS NOTES
"03/07/2023  Foot and Ankle Surgery - Established Patient/Follow-up  Provider: Dr. Maurice Pelletier DPM  Location: [facility]    Subjective:  Joe Noel is a 67 y.o. male.     Chief Complaint   Patient presents with   • Right Foot - Pain   • Follow-up     ALMAZ Barrios MD 06/17/2022       HPI:     The patient is a 67-year-old male who presents to the office today for a follow-up regarding his right foot.    The patient was last seen on 11/21/2022. At that time, he was doing well. Currently, he is experiencing right foot pain secondary to working and playing golf frequently over the last 1 month. He would like to obtain a repeat steroid injection today. The patient believes this is the 4th injection he has received.    No Known Allergies    Current Outpatient Medications on File Prior to Visit   Medication Sig Dispense Refill   • allopurinol (ZYLOPRIM) 300 MG tablet TAKE 1 TABLET DAILY 90 tablet 3   • ALPRAZolam (XANAX) 1 MG tablet Take 1 tablet by mouth every night at bedtime. 30 tablet 3   • aspirin (aspirin) 81 MG EC tablet Take 1 tablet by mouth Daily. 30 tablet 0   • meloxicam (Mobic) 7.5 MG tablet Take 1 tablet by mouth Daily. 90 tablet 1   • rosuvastatin (CRESTOR) 10 MG tablet Take 1 tablet by mouth Daily. 90 tablet 3   • VITAMIN D PO Take  by mouth.     • Xiidra 5 % ophthalmic solution        Current Facility-Administered Medications on File Prior to Visit   Medication Dose Route Frequency Provider Last Rate Last Admin   • cyanocobalamin injection 1,000 mcg  1,000 mcg Intramuscular Q28 Days Niurka Barrios MD   1,000 mcg at 05/20/21 1436   • cyanocobalamin injection 1,000 mcg  1,000 mcg Intramuscular Q28 Days Niurka Barrios MD   1,000 mcg at 06/17/22 1628       Objective   Ht 175.3 cm (69\")   Wt 83 kg (183 lb)   SpO2 97%   BMI 27.02 kg/m²     Foot/Ankle Exam   General:   Appearance: appears stated age and healthy    Orientation: AAOx3    Affect: appropriate    Gait: " antalgic       VASCULAR       Right Foot Vascularity   Normal vascular exam    Dorsalis pedis:  2+  Posterior tibial:  2+  Skin Temperature: warm    Edema Grading:  None  CFT:  < 3 seconds  Pedal Hair Growth:  Present  Varicosities: none        Left Foot Vascularity   Normal vascular exam    Dorsalis pedis:  2+  Posterior tibial:  2+  Skin Temperature: warm    Edema Grading:  None  CFT:  < 3 seconds  Pedal Hair Growth:  Present  Varicosities: none        NEUROLOGIC      Right Foot Neurologic   Light touch sensation:  Normal  Hot/Cold sensation: normal    Achilles reflex:  2+      Left Foot Neurologic   Light touch sensation:  Normal  Hot/cold sensation: normal    Achilles reflex:  2+      MUSCULOSKELETAL       Right Foot Musculoskeletal   Ecchymosis:  None  Tenderness: navicular and dorsal foot    Arch:  Normal      Left Foot Musculoskeletal   Ecchymosis:  None  Arch:  Normal      MUSCLE STRENGTH      Right Foot Muscle Strength   Normal strength    Foot dorsiflexion:  5  Foot plantar flexion:  5  Foot inversion:  5  Foot eversion:  5      Left Foot Muscle Strength   Normal strength    Foot dorsiflexion:  5  Foot plantar flexion:  5  Foot inversion:  5  Foot eversion:  5      DERMATOLOGIC      Right Foot Dermatologic   Skin: skin intact    Nails comment:  Nails 1-5      Left Foot Dermatologic   Skin: skin intact    Nails comment:  Nails 1-5      TESTS      Right Foot Tests   Anterior drawer: negative    Varus tilt: negative        Left Foot Tests   Anterior drawer: negative    Varus tilt: negative       Right Foot Additional Comments Moderate rigidity involving the midfoot and rear foot.  Discomfort with palpation at the level of the talonavicular joint.    03/07/2023  Continued discomfort involving the lateral aspect of the talonavicular joint region. No gross deformity or instability.    Assessment & Plan   Diagnoses and all orders for this visit:    1. Right foot pain (Primary)  -     triamcinolone acetonide  (KENALOG-40) injection 20 mg    2. Primary osteoarthritis of right foot    3. Acquired posterior equinus, right      Patient returns with continued discomfort involving his right foot.  He states that he has been doing relatively well and has been quite active but would like to consider repeat steroid injection if possible.  After evaluation, he continues to have discomfort involving the dorsal lateral aspect of the talonavicular joint region which is very consistent with previous assessments.  He does have tightness involving the posterior aspect of the leg which is likely complicating these issues with activity.  I have asked that he start stretching exercises routinely.  We did discuss manual therapy as well.  Injection was performed without complication, and I have asked that he continue to observe.  We did have a brief discussion regarding consideration for surgery.  Patient states that he may consider later this year.  I have asked that he follow-up in 6 months for reevaluation.  Greater than 20 minutes was spent before, during, and after evaluation for patient care.    No orders of the defined types were placed in this encounter.         Note is dictated utilizing voice recognition software. Unfortunately this leads to occasional typographical errors. I apologize in advance if the situation occurs. If questions occur please do not hesitate to call our office.    Transcribed from ambient dictation for LUIS Pelletier DPM by Joslyn Engel.  03/07/23   08:36 EST    Patient or patient representative verbalized consent to the visit recording.  I have personally performed the services described in this document as transcribed by the above individual, and it is both accurate and complete.

## 2023-03-07 NOTE — PROGRESS NOTES
03/07/2023    Talonavicular joint Steroid Injection: Right foot.     Consent and time out was performed before proceeding with the procedure.  The skin about the lateral aspect of the talonavicular joint region was cleansed with alcohol and anesthetized with approximately 1mL of 1% lidocaine plain.  A Betadine prep was then performed to the area in question.  Ultrasound guidance was used to evaluate and isolate the joint space.  Using an aseptic technique, a 1.5 mL solution containing 0.5 mL of 0.5% Marcaine plain, 0.5mL of 1% lidocaine plain and 0.5 mL of Kenalog was injected into the joint. After the injection, compression was applied followed by a sterile bandage.  The patient noted relief from pain and tolerated the injection well without complication.    Transcribed from ambient dictation for LUIS Pelletier DPM by Joslyn Engel.  03/07/23   08:39 EST    Patient or patient representative verbalized consent to the visit recording.  I have personally performed the services described in this document as transcribed by the above individual, and it is both accurate and complete.

## 2023-03-16 ENCOUNTER — OFFICE (AMBULATORY)
Dept: URBAN - METROPOLITAN AREA PATHOLOGY 4 | Facility: PATHOLOGY | Age: 68
End: 2023-03-16
Payer: COMMERCIAL

## 2023-03-16 ENCOUNTER — ON CAMPUS - OUTPATIENT (AMBULATORY)
Dept: URBAN - METROPOLITAN AREA HOSPITAL 2 | Facility: HOSPITAL | Age: 68
End: 2023-03-16
Payer: COMMERCIAL

## 2023-03-16 VITALS
WEIGHT: 174 LBS | HEART RATE: 71 BPM | DIASTOLIC BLOOD PRESSURE: 81 MMHG | DIASTOLIC BLOOD PRESSURE: 75 MMHG | SYSTOLIC BLOOD PRESSURE: 108 MMHG | HEIGHT: 71 IN | SYSTOLIC BLOOD PRESSURE: 128 MMHG | DIASTOLIC BLOOD PRESSURE: 78 MMHG | RESPIRATION RATE: 18 BRPM | SYSTOLIC BLOOD PRESSURE: 118 MMHG | OXYGEN SATURATION: 99 % | HEART RATE: 67 BPM | SYSTOLIC BLOOD PRESSURE: 113 MMHG | HEART RATE: 70 BPM | SYSTOLIC BLOOD PRESSURE: 135 MMHG | DIASTOLIC BLOOD PRESSURE: 83 MMHG | DIASTOLIC BLOOD PRESSURE: 91 MMHG | HEART RATE: 66 BPM | RESPIRATION RATE: 17 BRPM | DIASTOLIC BLOOD PRESSURE: 84 MMHG | DIASTOLIC BLOOD PRESSURE: 90 MMHG | SYSTOLIC BLOOD PRESSURE: 131 MMHG | OXYGEN SATURATION: 96 % | DIASTOLIC BLOOD PRESSURE: 97 MMHG | HEART RATE: 72 BPM | SYSTOLIC BLOOD PRESSURE: 105 MMHG | HEART RATE: 109 BPM | RESPIRATION RATE: 16 BRPM | TEMPERATURE: 97.3 F | SYSTOLIC BLOOD PRESSURE: 129 MMHG | DIASTOLIC BLOOD PRESSURE: 74 MMHG | OXYGEN SATURATION: 98 % | HEART RATE: 68 BPM | HEART RATE: 73 BPM | SYSTOLIC BLOOD PRESSURE: 126 MMHG | OXYGEN SATURATION: 100 %

## 2023-03-16 DIAGNOSIS — D12.2 BENIGN NEOPLASM OF ASCENDING COLON: ICD-10-CM

## 2023-03-16 DIAGNOSIS — Z80.0 FAMILY HISTORY OF MALIGNANT NEOPLASM OF DIGESTIVE ORGANS: ICD-10-CM

## 2023-03-16 DIAGNOSIS — D12.3 BENIGN NEOPLASM OF TRANSVERSE COLON: ICD-10-CM

## 2023-03-16 DIAGNOSIS — Z86.010 PERSONAL HISTORY OF COLONIC POLYPS: ICD-10-CM

## 2023-03-16 PROBLEM — K63.5 POLYP OF COLON: Status: ACTIVE | Noted: 2023-03-16

## 2023-03-16 LAB
GI HISTOLOGY: A. UNSPECIFIED: (no result)
GI HISTOLOGY: B. UNSPECIFIED: (no result)
GI HISTOLOGY: PDF REPORT: (no result)

## 2023-03-16 PROCEDURE — 45385 COLONOSCOPY W/LESION REMOVAL: CPT | Mod: PT | Performed by: INTERNAL MEDICINE

## 2023-03-16 PROCEDURE — 88305 TISSUE EXAM BY PATHOLOGIST: CPT | Mod: 26 | Performed by: INTERNAL MEDICINE

## 2023-03-23 ENCOUNTER — OFFICE VISIT (OUTPATIENT)
Dept: FAMILY MEDICINE CLINIC | Facility: CLINIC | Age: 68
End: 2023-03-23
Payer: MEDICARE

## 2023-03-23 VITALS
BODY MASS INDEX: 26.81 KG/M2 | HEIGHT: 69 IN | OXYGEN SATURATION: 97 % | HEART RATE: 86 BPM | WEIGHT: 181 LBS | SYSTOLIC BLOOD PRESSURE: 130 MMHG | DIASTOLIC BLOOD PRESSURE: 84 MMHG

## 2023-03-23 DIAGNOSIS — F41.9 ANXIETY: Primary | ICD-10-CM

## 2023-03-23 PROCEDURE — 99213 OFFICE O/P EST LOW 20 MIN: CPT | Performed by: INTERNAL MEDICINE

## 2023-03-23 PROCEDURE — 1160F RVW MEDS BY RX/DR IN RCRD: CPT | Performed by: INTERNAL MEDICINE

## 2023-03-23 PROCEDURE — 1159F MED LIST DOCD IN RCRD: CPT | Performed by: INTERNAL MEDICINE

## 2023-03-23 RX ORDER — BUSPIRONE HYDROCHLORIDE 5 MG/1
5 TABLET ORAL 2 TIMES DAILY
Qty: 60 TABLET | Refills: 1 | Status: SHIPPED | OUTPATIENT
Start: 2023-03-23

## 2023-03-23 NOTE — PROGRESS NOTES
"Rooming Tab(CC,VS,Pt Hx,Fall Screen)  Chief Complaint   Patient presents with   • Anxiety   • Hypertension       Subjective     I have reviewed and updated his medications, medical history and problem list during today's office visit.     The patient presents today for a follow-up.    Anxiety  The patient takes 0.5 a tablet of Xanax for sleep. He cannot take it during the day as it is too sedating. He inquires about an as needed anxiety medication. He feels daily stress about Dmitri who had a possible seizure recently. He has not been working in 03/2023 as much due to weather, which exacerbates his anxiety. When he is not working, his mind races, but he cannot focus.    Macular degeneration  The patient's vision in his left eye is deteriorating. He has macular degeneration. He takes prescription eye drops for dry eyes. His night vision is deteriorating and it is impacting his ability to drive. His vision in his right eye is stable.    Health maintenance  The patient has received all of his COVID-19 vaccines.    Patient Care Team:  Niurka Barrios MD as PCP - General (Internal Medicine)    Problem List Tab  Medications Tab  Synopsis Tab  Chart Review Tab  Care Everywhere Tab  Immunizations Tab  Patient History Tab    Social History     Tobacco Use   • Smoking status: Never   • Smokeless tobacco: Never   Substance Use Topics   • Alcohol use: Not Currently       Review of Systems    Objective     Rooming Tab(CC,VS,Pt Hx,Fall Screen)  /84   Pulse 86   Ht 175.3 cm (69\")   Wt 82.1 kg (181 lb)   SpO2 97%   BMI 26.73 kg/m²     Body mass index is 26.73 kg/m².    Physical Exam  Vitals and nursing note reviewed.   Constitutional:       Appearance: Normal appearance. He is well-developed.   HENT:      Head: Normocephalic and atraumatic.      Nose: No rhinorrhea.   Eyes:      Pupils: Pupils are equal, round, and reactive to light.   Cardiovascular:      Rate and Rhythm: Normal rate and regular rhythm.      Pulses: " Normal pulses.      Heart sounds: Normal heart sounds. No murmur heard.  Pulmonary:      Effort: Pulmonary effort is normal.      Breath sounds: Normal breath sounds.   Musculoskeletal:         General: No tenderness.      Cervical back: Normal range of motion and neck supple.   Skin:     Capillary Refill: Capillary refill takes less than 2 seconds.   Neurological:      Mental Status: He is alert and oriented to person, place, and time.   Psychiatric:         Mood and Affect: Mood normal.         Behavior: Behavior normal.          Statin Choice Calculator  Data Reviewed:         The data below has been reviewed by Niurka Barrios MD on 03/23/2023.          Assessment & Plan   Order Review Tab  Health Maintenance Tab  Patient Plan/Order Tab  Diagnoses and all orders for this visit:    1. Anxiety (Primary)  Comments:  discussed celexa versus buspar wants short acting currently    Other orders  -     busPIRone (BUSPAR) 5 MG tablet; Take 1 tablet by mouth 2 (Two) Times a Day.  Dispense: 60 tablet; Refill: 1        Wrapup Tab  Return if symptoms worsen or fail to improve.       They were informed of the diagnosis and treatment plan and directed to f/u for any further problems or concerns.      Transcribed from ambient dictation for Niurka Barrios MD by Taisha Rodriguez.  03/23/23   16:24 EDT    Patient or patient representative verbalized consent to the visit recording.  I have personally performed the services described in this document as transcribed by the above individual, and it is both accurate and complete.  Niurka Barrios MD  3/26/2023  21:58 EDT

## 2023-06-16 PROCEDURE — 81003 URINALYSIS AUTO W/O SCOPE: CPT | Performed by: INTERNAL MEDICINE

## 2023-06-16 PROCEDURE — 85025 COMPLETE CBC W/AUTO DIFF WBC: CPT | Performed by: INTERNAL MEDICINE

## 2023-06-16 PROCEDURE — G0103 PSA SCREENING: HCPCS | Performed by: INTERNAL MEDICINE

## 2023-06-16 PROCEDURE — 80061 LIPID PANEL: CPT | Performed by: INTERNAL MEDICINE

## 2023-06-16 PROCEDURE — 80053 COMPREHEN METABOLIC PANEL: CPT | Performed by: INTERNAL MEDICINE

## 2023-06-20 PROBLEM — Z00.00 MEDICARE ANNUAL WELLNESS VISIT, SUBSEQUENT: Status: ACTIVE | Noted: 2023-06-20

## 2023-06-20 PROBLEM — Z23 IMMUNIZATION DUE: Status: ACTIVE | Noted: 2023-06-20

## 2023-07-24 ENCOUNTER — TELEPHONE (OUTPATIENT)
Dept: FAMILY MEDICINE CLINIC | Facility: CLINIC | Age: 68
End: 2023-07-24
Payer: MEDICARE

## 2023-07-24 DIAGNOSIS — L98.9 SKIN LESION OF BACK: Primary | ICD-10-CM

## 2023-11-07 NOTE — TELEPHONE ENCOUNTER
Caller: Joe Noel    Relationship: Self    Best call back number: [phone]    What is the medical concern/diagnosis: MOLE CHANGING SHAPE ON BACK    What specialty or service is being requested: DERMATOLOGY    What is the provider, practice or medical service name: [facility] [place]     What is the office location: [address] [place] [address]    What is the office phone number: [phone]  
Dr. Barrios discussed a Dermatology referral at his Wellness visit last month but did not place the order, I have pended the order for signing.   
1 pair

## 2023-11-15 ENCOUNTER — OFFICE VISIT (OUTPATIENT)
Dept: FAMILY MEDICINE CLINIC | Facility: CLINIC | Age: 68
End: 2023-11-15
Payer: MEDICARE

## 2023-11-15 VITALS
WEIGHT: 175.2 LBS | OXYGEN SATURATION: 98 % | SYSTOLIC BLOOD PRESSURE: 118 MMHG | BODY MASS INDEX: 25.95 KG/M2 | RESPIRATION RATE: 16 BRPM | HEART RATE: 80 BPM | DIASTOLIC BLOOD PRESSURE: 72 MMHG | HEIGHT: 69 IN

## 2023-11-15 DIAGNOSIS — E53.8 B12 DEFICIENCY: ICD-10-CM

## 2023-11-15 DIAGNOSIS — N20.0 KIDNEY STONES: ICD-10-CM

## 2023-11-15 DIAGNOSIS — M1A.9XX0 CHRONIC GOUT WITHOUT TOPHUS, UNSPECIFIED CAUSE, UNSPECIFIED SITE: Primary | ICD-10-CM

## 2023-11-15 DIAGNOSIS — E78.41 ELEVATED LIPOPROTEIN(A): ICD-10-CM

## 2023-11-15 DIAGNOSIS — F41.9 ANXIETY: ICD-10-CM

## 2023-11-15 DIAGNOSIS — K21.9 GASTROESOPHAGEAL REFLUX DISEASE WITHOUT ESOPHAGITIS: ICD-10-CM

## 2023-11-15 PROCEDURE — 99214 OFFICE O/P EST MOD 30 MIN: CPT | Performed by: INTERNAL MEDICINE

## 2023-11-15 RX ORDER — ALPRAZOLAM 1 MG/1
0.5 TABLET ORAL
Qty: 30 TABLET | Refills: 0 | Status: SHIPPED | OUTPATIENT
Start: 2023-11-15

## 2023-11-15 RX ORDER — BUSPIRONE HYDROCHLORIDE 10 MG/1
10 TABLET ORAL 2 TIMES DAILY PRN
Qty: 60 TABLET | Refills: 3 | Status: SHIPPED | OUTPATIENT
Start: 2023-11-15 | End: 2024-03-14

## 2023-11-15 NOTE — PROGRESS NOTES
Chief Complaint  Establish Care and Med Refill    HPI:    Joe Noel presents to [facility]    Patient is need of medication refills with prior PCP leaving. Needing to establish with new PCP.     Hyperlipidemia  Rosuvastatin 10 mg daily. Compliant with statin. No side effects. Following a low-cholesterol diet.     GERD  Previously on medications, although much better controlled with diet modifications. Denies red flag symptoms.     Gout/kidney stones  Currently on allopurinol 300 mg daily. Previously had been triggered by drinking tea. Significantly improved with not drinking tea and allopurinol. No recent flair of gout in the past 10 years. Most recent kidney stone about about 10 year as well.     Anxiety  Patient has been under a lot of stress due to elderly aunt with multiple medical issues. Currently on BuSpar 10 mg twice daily and Xanax 0.5 mg nightly with improvement in mood. He has tried other medication issues without success.     Vitamin B12 deficiency  Previously on vitamin B-12 injection 100 mcg q28 days. He has not been getting injections and will hold off for now.     Most recent preventative care visit with Medicare wellness visit on 6/20/2023.  Due for flu, COVID, and shingles vaccines.    Review of Systems:  ROS negative unless otherwise noted in HPI above.    Past Medical History:   Diagnosis Date    Abnormal EKG     Allergies     Ankle joint loose body     Ankle pain, left     Arthritis     Arthritis of right foot     Blood in stool     Chest pain     Elevated LFTs     Fatigue     Foot pain     rt    GERD (gastroesophageal reflux disease)     Gout     H. pylori infection     H/O class III angina pectoris     Hip pain     Hyperlipidemia     Kidney stones     Leg pain     Low back pain     Macular degeneration     Malaise and fatigue     Myalgia     and/or myositis    Nondisplaced dome fracture of left talus, initial encounter for closed fracture     Prostatic  "hypertrophy     Benign prostatic hypertrophy    Sleep disorder          Current Outpatient Medications:     allopurinol (ZYLOPRIM) 300 MG tablet, Take 1 tablet by mouth Daily., Disp: 90 tablet, Rfl: 3    ALPRAZolam (XANAX) 1 MG tablet, Take 1 tablet by mouth every night at bedtime. (Patient taking differently: Take 0.5 tablets by mouth every night at bedtime.), Disp: 30 tablet, Rfl: 3    busPIRone (BUSPAR) 10 MG tablet, Take 1 tablet by mouth 2 (Two) Times a Day As Needed (anxiety) for up to 120 days., Disp: 60 tablet, Rfl: 3    rosuvastatin (CRESTOR) 10 MG tablet, Take 1 tablet by mouth Daily., Disp: 90 tablet, Rfl: 3    VITAMIN D PO, Take  by mouth., Disp: , Rfl:     Xiidra 5 % ophthalmic solution, , Disp: , Rfl:     Current Facility-Administered Medications:     cyanocobalamin injection 1,000 mcg, 1,000 mcg, Intramuscular, Q28 Days, Niurka Barrios MD, 1,000 mcg at 05/20/21 1436    cyanocobalamin injection 1,000 mcg, 1,000 mcg, Intramuscular, Q28 Days, Niurka Barrios MD, 1,000 mcg at 06/17/22 1628    cyanocobalamin injection 1,000 mcg, 1,000 mcg, Intramuscular, Q28 Days, Niurka Barrios MD, 1,000 mcg at 06/20/23 1531    Social History     Socioeconomic History    Marital status:    Tobacco Use    Smoking status: Never    Smokeless tobacco: Never   Vaping Use    Vaping Use: Never used   Substance and Sexual Activity    Alcohol use: Not Currently    Drug use: Not Currently    Sexual activity: Defer        Objective   Vital Signs:  /72   Pulse 80   Resp 16   Ht 175.3 cm (69\")   Wt 79.5 kg (175 lb 3.2 oz)   SpO2 98%   BMI 25.87 kg/m²   Estimated body mass index is 25.87 kg/m² as calculated from the following:    Height as of this encounter: 175.3 cm (69\").    Weight as of this encounter: 79.5 kg (175 lb 3.2 oz).    Physical Exam:  General: Well-appearing patient, no apparent distress  HEENT: No posterior pharynx erythema, no tonsillar erythema or exudates  Cardiac: Regular rate " and rhythm, normal S1/S2, no murmur, rubs or gallops, no lower extremity edema  Lungs: Clear to auscultation bilaterally, no crackles or wheezes  Abdomen: Soft, non-tender, no guarding or rebound tenderness, no hepatosplenomegaly  Skin: No significant rashes or lesions  MSK: Grossly normal tone and strength  Neuro: Alert and oriented x3, CN II-XII grossly intact  Psych: Appropriate mood and affect    Assessment and Plan:    (F41.9) Anxiety   Assessment: Mood overall remains stressed and anxious due to sons and aunt's health issues but much improved since starting BuSpar.  Discussed possible treatment options including increasing BuSpar or adding SSRI.  We will continue current medications as prescribed, although patient will notify physician if mood changes and desires medication change.  Plan:  - Continue Buspar and Xanax as prescribed  - Consider adding SSRI if worsening symtoms    (M1A.9XX0) Chronic gout without tophus, unspecified cause, unspecified site  Assessment: Symptoms well controlled on allopurinol without recent flares.  Plan:  - Continue allopurinol  - Avoid triggers    (N20.0) Kidney stones  Assessment: Symptoms well controlled on allopurinol without recent kidney stone.  Plan:  - Continue allopurinol  - Avoid triggers  - Stay well-hydrated    (K21.9) Gastroesophageal reflux disease without esophagitis  Assessment: Diet controlled.  Not currently on medications.  Plan:  - Continue with diet modifications  - Avoid triggers    (E78.41) Elevated lipoprotein(a)  Assessment: Stable on statin without side effects. Discussed importance of healthy diet and exercise.  Plan:  - Fasting lipid panel at preventative care visit  - Continue statin without changes  - Discussed healthy diet and lifestyle     (E53.8) B12 deficiency   Assessment: Previously had been on replacement and would consider in the future.  Plan:  - Vitamin B12 level at Medicare visit  - Consider replacement    Patient was given instructions and  counseling regarding his condition or for health maintenance advice. Please see specific information pulled into the AVS if appropriate.       Dr Dennys Mathew   Internal Medicine Physician  [facility]  [address], [address]  [place] [address]

## 2023-11-15 NOTE — PATIENT INSTRUCTIONS
Please be fasting to Medicare wellness visit    Medications:  Continue current medications as prescribed    Encourage healthy diet and exercise    Follow up with specialists as scheduled    Follow up for Medicare wellness visit at the end of Beth

## 2024-01-05 DIAGNOSIS — F41.9 ANXIETY: ICD-10-CM

## 2024-01-05 RX ORDER — ALPRAZOLAM 1 MG/1
0.5 TABLET ORAL
Qty: 30 TABLET | Refills: 3 | Status: SHIPPED | OUTPATIENT
Start: 2024-01-05

## 2024-01-31 ENCOUNTER — LAB (OUTPATIENT)
Dept: FAMILY MEDICINE CLINIC | Facility: CLINIC | Age: 69
End: 2024-01-31
Payer: MEDICARE

## 2024-01-31 ENCOUNTER — OFFICE VISIT (OUTPATIENT)
Dept: FAMILY MEDICINE CLINIC | Facility: CLINIC | Age: 69
End: 2024-01-31
Payer: MEDICARE

## 2024-01-31 VITALS
WEIGHT: 179 LBS | HEART RATE: 70 BPM | RESPIRATION RATE: 16 BRPM | HEIGHT: 69 IN | OXYGEN SATURATION: 99 % | BODY MASS INDEX: 26.51 KG/M2 | SYSTOLIC BLOOD PRESSURE: 122 MMHG | DIASTOLIC BLOOD PRESSURE: 82 MMHG

## 2024-01-31 DIAGNOSIS — H35.30 MACULAR DEGENERATION, UNSPECIFIED LATERALITY, UNSPECIFIED TYPE: ICD-10-CM

## 2024-01-31 DIAGNOSIS — R10.32 LLQ PAIN: Primary | ICD-10-CM

## 2024-01-31 DIAGNOSIS — R10.32 LLQ PAIN: ICD-10-CM

## 2024-01-31 DIAGNOSIS — N20.0 KIDNEY STONES: ICD-10-CM

## 2024-01-31 LAB
ALBUMIN SERPL-MCNC: 4.4 G/DL (ref 3.5–5.2)
ALBUMIN/GLOB SERPL: 1.7 G/DL
ALP SERPL-CCNC: 70 U/L (ref 39–117)
ALT SERPL W P-5'-P-CCNC: 18 U/L (ref 1–41)
ANION GAP SERPL CALCULATED.3IONS-SCNC: 12.7 MMOL/L (ref 5–15)
AST SERPL-CCNC: 17 U/L (ref 1–40)
BASOPHILS # BLD AUTO: 0.02 10*3/MM3 (ref 0–0.2)
BASOPHILS NFR BLD AUTO: 0.3 % (ref 0–1.5)
BILIRUB SERPL-MCNC: 0.5 MG/DL (ref 0–1.2)
BILIRUB UR QL STRIP: NEGATIVE
BUN SERPL-MCNC: 22 MG/DL (ref 8–23)
BUN/CREAT SERPL: 19.8 (ref 7–25)
CALCIUM SPEC-SCNC: 9.7 MG/DL (ref 8.6–10.5)
CHLORIDE SERPL-SCNC: 104 MMOL/L (ref 98–107)
CLARITY UR: CLEAR
CO2 SERPL-SCNC: 24.3 MMOL/L (ref 22–29)
COLOR UR: YELLOW
CREAT SERPL-MCNC: 1.11 MG/DL (ref 0.76–1.27)
DEPRECATED RDW RBC AUTO: 41.8 FL (ref 37–54)
EGFRCR SERPLBLD CKD-EPI 2021: 71.4 ML/MIN/1.73
EOSINOPHIL # BLD AUTO: 0.21 10*3/MM3 (ref 0–0.4)
EOSINOPHIL NFR BLD AUTO: 2.7 % (ref 0.3–6.2)
ERYTHROCYTE [DISTWIDTH] IN BLOOD BY AUTOMATED COUNT: 12.4 % (ref 12.3–15.4)
GLOBULIN UR ELPH-MCNC: 2.6 GM/DL
GLUCOSE SERPL-MCNC: 88 MG/DL (ref 65–99)
GLUCOSE UR STRIP-MCNC: NEGATIVE MG/DL
HCT VFR BLD AUTO: 42.6 % (ref 37.5–51)
HGB BLD-MCNC: 14.2 G/DL (ref 13–17.7)
HGB UR QL STRIP.AUTO: NEGATIVE
HOLD SPECIMEN: NORMAL
IMM GRANULOCYTES # BLD AUTO: 0.06 10*3/MM3 (ref 0–0.05)
IMM GRANULOCYTES NFR BLD AUTO: 0.8 % (ref 0–0.5)
KETONES UR QL STRIP: NEGATIVE
LEUKOCYTE ESTERASE UR QL STRIP.AUTO: NEGATIVE
LYMPHOCYTES # BLD AUTO: 1.39 10*3/MM3 (ref 0.7–3.1)
LYMPHOCYTES NFR BLD AUTO: 17.7 % (ref 19.6–45.3)
MCH RBC QN AUTO: 31 PG (ref 26.6–33)
MCHC RBC AUTO-ENTMCNC: 33.3 G/DL (ref 31.5–35.7)
MCV RBC AUTO: 93 FL (ref 79–97)
MONOCYTES # BLD AUTO: 0.75 10*3/MM3 (ref 0.1–0.9)
MONOCYTES NFR BLD AUTO: 9.6 % (ref 5–12)
NEUTROPHILS NFR BLD AUTO: 5.41 10*3/MM3 (ref 1.7–7)
NEUTROPHILS NFR BLD AUTO: 68.9 % (ref 42.7–76)
NITRITE UR QL STRIP: NEGATIVE
NRBC BLD AUTO-RTO: 0 /100 WBC (ref 0–0.2)
PH UR STRIP.AUTO: 6.5 [PH] (ref 5–8)
PLATELET # BLD AUTO: 205 10*3/MM3 (ref 140–450)
PMV BLD AUTO: 10.5 FL (ref 6–12)
POTASSIUM SERPL-SCNC: 4.6 MMOL/L (ref 3.5–5.2)
PROT SERPL-MCNC: 7 G/DL (ref 6–8.5)
PROT UR QL STRIP: NEGATIVE
RBC # BLD AUTO: 4.58 10*6/MM3 (ref 4.14–5.8)
SODIUM SERPL-SCNC: 141 MMOL/L (ref 136–145)
SP GR UR STRIP: 1.02 (ref 1–1.03)
UROBILINOGEN UR QL STRIP: NORMAL
WBC NRBC COR # BLD AUTO: 7.84 10*3/MM3 (ref 3.4–10.8)

## 2024-01-31 PROCEDURE — 99214 OFFICE O/P EST MOD 30 MIN: CPT | Performed by: INTERNAL MEDICINE

## 2024-01-31 PROCEDURE — 85025 COMPLETE CBC W/AUTO DIFF WBC: CPT | Performed by: INTERNAL MEDICINE

## 2024-01-31 PROCEDURE — 81003 URINALYSIS AUTO W/O SCOPE: CPT | Performed by: INTERNAL MEDICINE

## 2024-01-31 PROCEDURE — 80053 COMPREHEN METABOLIC PANEL: CPT | Performed by: INTERNAL MEDICINE

## 2024-01-31 PROCEDURE — 36415 COLL VENOUS BLD VENIPUNCTURE: CPT

## 2024-01-31 NOTE — PATIENT INSTRUCTIONS
Please stop at lab on second floor to have blood drawn    Please schedule CT abdomen and pelvis    Medications:  Continue current medications as prescribed    Stay well hydrated, diet as tolerate    Future plans pending results    Follow up if not improving

## 2024-01-31 NOTE — PROGRESS NOTES
Chief Complaint  Abdominal Pain, Heartburn, and Gas    HPI:    Joe Noel presents to [facility]    Patient is a 68-year-old male with a history of hyperlipidemia, GERD, BPH, nephrolithiasis gout, and anxiety presenting for evaluation of left lower quadrant pain.    Patient with history of nephrolithiasis for which he has previously had to have urological procedures. Most recent procedure was about 7-8 years ago. Occasionally he will get left lower quadrant discomfort and feel like he is passing a small stone. He has had about a week of left lower quadrant pain associated with some gas and reflux. Pain is mild described as intermittent, non-radiating, sharp pain predominantly found in the left lower quadrant. Symptoms worse with position and pressure and nothing really makes better. He feels normal at times and then will feel that it will occur. Denies  diarrhea, constipation, hematochezia, melena, jaundice. Denies fever, chills, nausea, vomiting. Denies dysuria, urgency, frequency, hematuria, or flank pain. Denies new medications or changes in medication.  Denies new sick contacts, food/water exposures, travel, or antibiotics.  Most recent colonoscopy 3/16/2023. Denies personal or family history of IBS, inflammatory bowel disease, or colon cancer. Denies prior abdominal or pelvic surgeries. Tolerating oral intake and staying well hydrated. Patient overall feels that symptoms have been static.     Patient with a history of dry macular degeneration for which he follows with specialist.  Vision symptoms have recently overall been stable.  Currently scheduled for new eye injection treatment.    Review of Systems:  ROS negative unless otherwise noted in HPI above.    Past Medical History:   Diagnosis Date    Abnormal EKG     Allergies     Ankle joint loose body     Ankle pain, left     Arthritis     Arthritis of right foot     Blood in stool     Chest pain     Elevated LFTs      "Fatigue     Foot pain     rt    GERD (gastroesophageal reflux disease)     Gout     H. pylori infection     H/O class III angina pectoris     Hip pain     Hyperlipidemia     Kidney stones     Leg pain     Low back pain     Macular degeneration     Malaise and fatigue     Myalgia     and/or myositis    Nondisplaced dome fracture of left talus, initial encounter for closed fracture     Prostatic hypertrophy     Benign prostatic hypertrophy    Sleep disorder          Current Outpatient Medications:     allopurinol (ZYLOPRIM) 300 MG tablet, Take 1 tablet by mouth Daily., Disp: 90 tablet, Rfl: 3    ALPRAZolam (XANAX) 1 MG tablet, Take 0.5 tablets by mouth every night at bedtime., Disp: 30 tablet, Rfl: 3    busPIRone (BUSPAR) 10 MG tablet, Take 1 tablet by mouth 2 (Two) Times a Day As Needed (anxiety) for up to 120 days., Disp: 60 tablet, Rfl: 3    rosuvastatin (CRESTOR) 10 MG tablet, Take 1 tablet by mouth Daily., Disp: 90 tablet, Rfl: 3    VITAMIN D PO, Take  by mouth., Disp: , Rfl:     Xiidra 5 % ophthalmic solution, , Disp: , Rfl:     Current Facility-Administered Medications:     cyanocobalamin injection 1,000 mcg, 1,000 mcg, Intramuscular, Q28 Days, Niurka Barrios MD, 1,000 mcg at 05/20/21 1436    cyanocobalamin injection 1,000 mcg, 1,000 mcg, Intramuscular, Q28 Days, Niurka Barrios MD, 1,000 mcg at 06/17/22 1628    cyanocobalamin injection 1,000 mcg, 1,000 mcg, Intramuscular, Q28 Days, Niurka Barrios MD, 1,000 mcg at 06/20/23 1531    Social History     Socioeconomic History    Marital status:    Tobacco Use    Smoking status: Never    Smokeless tobacco: Never   Vaping Use    Vaping Use: Never used   Substance and Sexual Activity    Alcohol use: Not Currently    Drug use: Not Currently    Sexual activity: Defer        Objective   Vital Signs:  /82   Pulse 70   Resp 16   Ht 175.3 cm (69\")   Wt 81.2 kg (179 lb)   SpO2 99%   BMI 26.43 kg/m²   Estimated body mass index is 26.43 " "kg/m² as calculated from the following:    Height as of this encounter: 175.3 cm (69\").    Weight as of this encounter: 81.2 kg (179 lb).    Physical Exam:  General: Well-appearing patient, no apparent distress  HEENT: No posterior pharynx erythema, no tonsillar erythema or exudates  Cardiac: Regular rate and rhythm, normal S1/S2, no murmur, rubs or gallops, no lower extremity edema  Lungs: Clear to auscultation bilaterally, no crackles or wheezes  Abdomen: Soft, mild tenderness to palpation in the left lower quadrant no guarding or rebound tenderness, no hepatosplenomegaly  Skin: No significant rashes or lesions  MSK: Grossly normal tone and strength, no CVA tenderness  Neuro: Alert and oriented x3, CN II-XII grossly intact  Psych: Appropriate mood and affect    Assessment and Plan:    (R10.32) LLQ pain -   Assessment: Patient with a history of kidney stones with approximately 1 week of left lower quadrant abdominal pain.  No significant flank discomfort or urinary symptoms.  Exam notable for mild tenderness palpation of left lower quadrant.  No red flag symptoms.  Currently unclear etiology for symptoms.  Could potentially be nephrolithiasis, or possible diverticulosis/-itis given location. Proceeding with basic laboratory workup and ordering CT scan to be done.  If symptoms improve, patient planning on canceling CT scan.  Patient aware of signs and symptoms which prompt reevaluation.  Plan:    - Comprehensive metabolic panel, CBC & Differential, Urinalysis With Culture If Indicated -  - CT Abdomen Pelvis Without Contrast,  - Stay well hydrated, diet as tolerate  - Future plans pending results  - Follow up if not improving    (N20.0) Kidney stones  Assessment: Known history of nephrolithiasis status post multiple procedures.  Previous passage of stones have since felt similar, although symptoms have not usually persisted as long.  Plan:  - Stay well-hydrated  - OTC analgesia as needed  - Labs and imaging as " above  - Follow-up if new or worsening symptoms    (H35.30) Macular degeneration, unspecified laterality, unspecified type  Assessment: Follows with eye specialist and due for upcoming injection.  Plan:  - Follow-up as scheduled      Patient was given instructions and counseling regarding his condition or for health maintenance advice. Please see specific information pulled into the AVS if appropriate.       Dr Dennys Mathew   Internal Medicine Physician  [facility]  [address], [address]  [place] [address]

## 2024-02-14 ENCOUNTER — HOSPITAL ENCOUNTER (OUTPATIENT)
Dept: PET IMAGING | Facility: HOSPITAL | Age: 69
Discharge: HOME | End: 2024-02-14
Admitting: INTERNAL MEDICINE
Payer: MEDICARE

## 2024-02-14 DIAGNOSIS — R10.32 LLQ PAIN: ICD-10-CM

## 2024-02-14 PROCEDURE — 74176 CT ABD & PELVIS W/O CONTRAST: CPT

## 2024-02-15 DIAGNOSIS — N20.0 KIDNEY STONES: Primary | ICD-10-CM

## 2024-02-28 ENCOUNTER — LAB (OUTPATIENT)
Dept: LAB | Facility: HOSPITAL | Age: 69
End: 2024-02-28
Payer: MEDICARE

## 2024-02-28 ENCOUNTER — HOSPITAL ENCOUNTER (OUTPATIENT)
Dept: CARDIOLOGY | Facility: HOSPITAL | Age: 69
Discharge: HOME | End: 2024-02-28
Payer: MEDICARE

## 2024-02-28 ENCOUNTER — TRANSCRIBE ORDERS (OUTPATIENT)
Dept: ADMINISTRATIVE | Facility: HOSPITAL | Age: 69
End: 2024-02-28
Payer: MEDICARE

## 2024-02-28 DIAGNOSIS — N21.0 BLADDER STONES: ICD-10-CM

## 2024-02-28 DIAGNOSIS — N21.0 BLADDER STONES: Primary | ICD-10-CM

## 2024-02-28 LAB
ANION GAP SERPL CALCULATED.3IONS-SCNC: 12 MMOL/L (ref 5–15)
BASOPHILS # BLD AUTO: 0.02 10*3/MM3 (ref 0–0.2)
BASOPHILS NFR BLD AUTO: 0.3 % (ref 0–1.5)
BUN SERPL-MCNC: 21 MG/DL (ref 8–23)
BUN/CREAT SERPL: 18.3 (ref 7–25)
CALCIUM SPEC-SCNC: 9.7 MG/DL (ref 8.6–10.5)
CHLORIDE SERPL-SCNC: 106 MMOL/L (ref 98–107)
CO2 SERPL-SCNC: 24 MMOL/L (ref 22–29)
CREAT SERPL-MCNC: 1.15 MG/DL (ref 0.76–1.27)
DEPRECATED RDW RBC AUTO: 41.5 FL (ref 37–54)
EGFRCR SERPLBLD CKD-EPI 2021: 68.5 ML/MIN/1.73
EOSINOPHIL # BLD AUTO: 0.13 10*3/MM3 (ref 0–0.4)
EOSINOPHIL NFR BLD AUTO: 2 % (ref 0.3–6.2)
ERYTHROCYTE [DISTWIDTH] IN BLOOD BY AUTOMATED COUNT: 12.4 % (ref 12.3–15.4)
GLUCOSE SERPL-MCNC: 80 MG/DL (ref 65–99)
HCT VFR BLD AUTO: 42.6 % (ref 37.5–51)
HGB BLD-MCNC: 13.9 G/DL (ref 13–17.7)
IMM GRANULOCYTES # BLD AUTO: 0.1 10*3/MM3 (ref 0–0.05)
IMM GRANULOCYTES NFR BLD AUTO: 1.5 % (ref 0–0.5)
LYMPHOCYTES # BLD AUTO: 1.57 10*3/MM3 (ref 0.7–3.1)
LYMPHOCYTES NFR BLD AUTO: 23.6 % (ref 19.6–45.3)
MCH RBC QN AUTO: 30 PG (ref 26.6–33)
MCHC RBC AUTO-ENTMCNC: 32.6 G/DL (ref 31.5–35.7)
MCV RBC AUTO: 92 FL (ref 79–97)
MONOCYTES # BLD AUTO: 0.6 10*3/MM3 (ref 0.1–0.9)
MONOCYTES NFR BLD AUTO: 9 % (ref 5–12)
NEUTROPHILS NFR BLD AUTO: 4.24 10*3/MM3 (ref 1.7–7)
NEUTROPHILS NFR BLD AUTO: 63.6 % (ref 42.7–76)
NRBC BLD AUTO-RTO: 0 /100 WBC (ref 0–0.2)
PLATELET # BLD AUTO: 218 10*3/MM3 (ref 140–450)
PMV BLD AUTO: 10.5 FL (ref 6–12)
POTASSIUM SERPL-SCNC: 4.4 MMOL/L (ref 3.5–5.2)
QT INTERVAL: 401 MS
QTC INTERVAL: 407 MS
RBC # BLD AUTO: 4.63 10*6/MM3 (ref 4.14–5.8)
SODIUM SERPL-SCNC: 142 MMOL/L (ref 136–145)
WBC NRBC COR # BLD AUTO: 6.66 10*3/MM3 (ref 3.4–10.8)

## 2024-02-28 PROCEDURE — 93010 ELECTROCARDIOGRAM REPORT: CPT | Performed by: INTERNAL MEDICINE

## 2024-02-28 PROCEDURE — 36415 COLL VENOUS BLD VENIPUNCTURE: CPT

## 2024-02-28 PROCEDURE — 85025 COMPLETE CBC W/AUTO DIFF WBC: CPT

## 2024-02-28 PROCEDURE — 80048 BASIC METABOLIC PNL TOTAL CA: CPT

## 2024-02-28 PROCEDURE — 93005 ELECTROCARDIOGRAM TRACING: CPT | Performed by: UROLOGY

## 2024-03-04 PROCEDURE — 82365 CALCULUS SPECTROSCOPY: CPT | Performed by: UROLOGY

## 2024-03-05 ENCOUNTER — LAB REQUISITION (OUTPATIENT)
Dept: LAB | Facility: HOSPITAL | Age: 69
End: 2024-03-05
Payer: MEDICARE

## 2024-03-05 DIAGNOSIS — N21.0 CALCULUS IN BLADDER: ICD-10-CM

## 2024-03-12 LAB
CALCIUM OXALATE DIHYDRATE MFR STONE IR: 90 %
COLOR STONE: NORMAL
COM MFR STONE: 10 %
COMPN STONE: NORMAL
LABORATORY COMMENT REPORT: NORMAL
LABORATORY COMMENT REPORT: NORMAL
Lab: NORMAL
Lab: NORMAL
PHOTO: NORMAL
SIZE STONE: NORMAL MM
SPEC SOURCE SUBJ: NORMAL
WT STONE: 1538 MG

## 2024-04-01 RX ORDER — BUSPIRONE HYDROCHLORIDE 10 MG/1
10 TABLET ORAL 2 TIMES DAILY PRN
Qty: 60 TABLET | Refills: 3 | Status: SHIPPED | OUTPATIENT
Start: 2024-04-01

## 2024-04-02 DIAGNOSIS — F41.9 ANXIETY: ICD-10-CM

## 2024-04-03 ENCOUNTER — OFFICE VISIT (OUTPATIENT)
Dept: PODIATRY | Facility: CLINIC | Age: 69
End: 2024-04-03
Payer: MEDICARE

## 2024-04-03 VITALS — RESPIRATION RATE: 20 BRPM | WEIGHT: 179 LBS | HEIGHT: 69 IN | BODY MASS INDEX: 26.51 KG/M2

## 2024-04-03 DIAGNOSIS — M21.961 FOOT DEFORMITY, RIGHT: ICD-10-CM

## 2024-04-03 DIAGNOSIS — M19.071 ARTHRITIS OF RIGHT FOOT: ICD-10-CM

## 2024-04-03 DIAGNOSIS — G89.29 CHRONIC FOOT PAIN, RIGHT: Primary | ICD-10-CM

## 2024-04-03 DIAGNOSIS — M79.671 CHRONIC FOOT PAIN, RIGHT: Primary | ICD-10-CM

## 2024-04-03 RX ORDER — ALPRAZOLAM 1 MG/1
0.5 TABLET ORAL
Qty: 30 TABLET | Refills: 3 | Status: SHIPPED | OUTPATIENT
Start: 2024-04-03

## 2024-04-03 RX ORDER — TRIAMCINOLONE ACETONIDE 40 MG/ML
20 INJECTION, SUSPENSION INTRA-ARTICULAR; INTRAMUSCULAR ONCE
Status: COMPLETED | OUTPATIENT
Start: 2024-04-03 | End: 2024-04-03

## 2024-04-03 RX ADMIN — TRIAMCINOLONE ACETONIDE 20 MG: 40 INJECTION, SUSPENSION INTRA-ARTICULAR; INTRAMUSCULAR at 12:32

## 2024-04-03 NOTE — PROGRESS NOTES
"04/03/2024  Foot and Ankle Surgery - Established Patient/Follow-up  Provider: Dr. Maurice Pelletier DPM  Location: [facility]    Subjective:  Joe Noel is a 68 y.o. male.     Chief Complaint   Patient presents with    Right Foot - Follow-up, Pain    Right Ankle - Pain, Follow-up    Follow-up     CLARK Mathew md  1/31/2024       HPI: The patient is a 68-year-old female who is seen here for follow-up regarding the right foot.    The patient has been abstaining from receiving an injection for the past year due to an impending long-term requirement. He expresses a desire to receive another injection today, citing recurrent pain during the winter months.    No Known Allergies    Current Outpatient Medications on File Prior to Visit   Medication Sig Dispense Refill    allopurinol (ZYLOPRIM) 300 MG tablet Take 1 tablet by mouth Daily. 90 tablet 3    busPIRone (BUSPAR) 10 MG tablet TAKE 1 TABLET BY MOUTH TWICE DAILY AS NEEDED FOR ANXIETY 60 tablet 3    rosuvastatin (CRESTOR) 10 MG tablet Take 1 tablet by mouth Daily. 90 tablet 3    VITAMIN D PO Take  by mouth.      Xiidra 5 % ophthalmic solution       ALPRAZolam (XANAX) 1 MG tablet Take 0.5 tablets by mouth every night at bedtime. 30 tablet 3     Current Facility-Administered Medications on File Prior to Visit   Medication Dose Route Frequency Provider Last Rate Last Admin    cyanocobalamin injection 1,000 mcg  1,000 mcg Intramuscular Q28 Days Niurka Barrios MD   1,000 mcg at 05/20/21 1436    cyanocobalamin injection 1,000 mcg  1,000 mcg Intramuscular Q28 Days Niurka Barrios MD   1,000 mcg at 06/17/22 1628    cyanocobalamin injection 1,000 mcg  1,000 mcg Intramuscular Q28 Days Niurka Barrois MD   1,000 mcg at 06/20/23 1531       Objective   Resp 20   Ht 175.3 cm (69\")   Wt 81.2 kg (179 lb)   BMI 26.43 kg/m²     Foot/Ankle Exam    GENERAL  Orientation:  AAOx3  Affect:  appropriate  Gait:  antalgic    VASCULAR     Right Foot Vascularity "   Normal vascular exam    Dorsalis pedis:  2+  Posterior tibial:  2+  Skin temperature:  warm  Edema grading:  None  CFT:  < 3 seconds  Pedal hair growth:  Present  Varicosities:  none     Left Foot Vascularity   Normal vascular exam    Dorsalis pedis:  2+  Posterior tibial:  2+  Skin temperature:  warm  Edema grading:  None  CFT:  < 3 seconds  Pedal hair growth:  Present  Varicosities:  none     NEUROLOGIC     Right Foot Neurologic   Light touch sensation: normal  Hot/Cold sensation: normal  Achilles reflex:  2+     Left Foot Neurologic   Light touch sensation: normal  Hot/Cold sensation:  normal  Achilles reflex:  2+    MUSCULOSKELETAL     Right Foot Musculoskeletal   Tenderness:  dorsal foot and navicular tenderness    Arch:  Normal     Left Foot Musculoskeletal   Arch:  Normal    MUSCLE STRENGTH     Right Foot Muscle Strength   Normal strength    Foot dorsiflexion:  5  Foot plantar flexion:  5  Foot inversion:  5  Foot eversion:  5     Left Foot Muscle Strength   Normal strength    Foot dorsiflexion:  5  Foot plantar flexion:  5  Foot inversion:  5  Foot eversion:  5    DERMATOLOGIC      Right Foot Dermatologic   Skin  Right foot skin is intact.   Nails comment:  Nails 1-5     Left Foot Dermatologic   Skin  Left foot skin is intact.   Nails comment:  Nails 1-5    TESTS     Right Foot Tests   Anterior drawer: negative  Varus tilt: negative     Left Foot Tests   Anterior drawer: negative  Varus tilt: negative     Right foot additional comments: Moderate rigidity involving the midfoot and rear foot.  Discomfort with palpation at the level of the talonavicular joint.    03/07/2023  Continued discomfort involving the lateral aspect of the talonavicular joint region. No gross deformity or instability.    04/03/2024  Discomfort with palpation involving the dorsal aspect of the midfoot. No progressive deformity or instability. Mild pronation and forefoot abduction with stance. Bony prominence noted to the talonavicular  joint region.      Assessment & Plan   Diagnoses and all orders for this visit:    1. Chronic foot pain, right (Primary)  -     XR Foot 3+ View Right  -     triamcinolone acetonide (KENALOG-40) injection 20 mg    2. Arthritis of right foot  -     XR Ankle 3+ View Right  -     triamcinolone acetonide (KENALOG-40) injection 20 mg    3. Foot deformity, right      Patient returns with increased pain involving his right foot.  He states that he made it a year since previous evaluation and treatment for this issue.  He continues to have pain and limitation.  Imaging was obtained and independently reviewed showing continued and progressive degenerative changes involving the talonavicular joint.  He does show increased forefoot abduction with stance.  I discussed these issues with him at length.  He understands that surgery is ultimately required to manage this issue.  Patient states that he is unable to proceed with any type of surgery at this time and would like to consider repeat steroid injection.  Procedure was performed without complication utilizing ultrasound guidance.  I have asked that he continue to wear supportive shoes and inserts.  Patient is to monitor his overall symptoms and return in 3 months for reevaluation.  Greater than 20 minutes spent before, during, and after evaluation for patient care.    Orders Placed This Encounter   Procedures    XR Foot 3+ View Right     Order Specific Question:   Reason for Exam:     Answer:   dorsal pain x years  room 14  wb     Order Specific Question:   Release to patient     Answer:   Routine Release [[id number]]    XR Ankle 3+ View Right     Order Specific Question:   Reason for Exam:     Answer:   lateral ankle pain x years  room 14  wb     Order Specific Question:   Does this patient have a diabetic monitoring/medication delivering device on?     Answer:   No     Order Specific Question:   Release to patient     Answer:   Routine Release [[id number]]          Note is  dictated utilizing voice recognition software. Unfortunately this leads to occasional typographical errors. I apologize in advance if the situation occurs. If questions occur please do not hesitate to call our office.    Transcribed from ambient dictation for LUIS Pelletier DPM by Parminder Waller.  04/03/24   09:28 EDT    Patient or patient representative verbalized consent to the visit recording.  I have personally performed the services described in this document as transcribed by the above individual, and it is both accurate and complete.

## 2024-04-03 NOTE — PROGRESS NOTES
"Talonavicular joint Steroid Injection: Right foot    Consent and time out was performed before proceeding with the procedure. The skin around the anterior lateral portal of the joint was cleansed with alcohol and initially anesthetized with approximately 1.0 mL of 1% lidocaine plain. The area was then prepped with betadine and allowed to dry.  Using aseptic technique and ultrasound, a solution totaling 1.5mL was injected into the ankle joint utilizing a 22g 1.5\" hypodermic needle. The solution contained 0.5cc of 0.5% Marcaine plain, 0.5cc of 1% lidocaine plain, and 0.5cc of Kenalog.  Mild compression was then applied to the injection site and bandage applied.  The patient noted immediate pain relief with active range of motion and neurovascular status remaining intact. The patient tolerated the injection well without complication.     Transcribed from ambient dictation for LUIS Pelletier DPM by Parminder Waller.  04/03/24   09:47 EDT    Patient or patient representative verbalized consent to the visit recording.  I have personally performed the services described in this document as transcribed by the above individual, and it is both accurate and complete.        " negative

## 2024-05-17 RX ORDER — ALLOPURINOL 300 MG/1
300 TABLET ORAL DAILY
Qty: 90 TABLET | Refills: 0 | Status: SHIPPED | OUTPATIENT
Start: 2024-05-17

## 2024-06-07 ENCOUNTER — HOSPITAL ENCOUNTER (EMERGENCY)
Facility: HOSPITAL | Age: 69
Discharge: HOME | End: 2024-06-07
Attending: EMERGENCY MEDICINE | Admitting: EMERGENCY MEDICINE
Payer: MEDICARE

## 2024-06-07 ENCOUNTER — APPOINTMENT (OUTPATIENT)
Dept: CT IMAGING | Facility: HOSPITAL | Age: 69
End: 2024-06-07
Payer: MEDICARE

## 2024-06-07 VITALS
OXYGEN SATURATION: 98 % | DIASTOLIC BLOOD PRESSURE: 69 MMHG | WEIGHT: 175 LBS | SYSTOLIC BLOOD PRESSURE: 128 MMHG | BODY MASS INDEX: 24.5 KG/M2 | RESPIRATION RATE: 20 BRPM | HEART RATE: 69 BPM | HEIGHT: 71 IN | TEMPERATURE: 97.8 F

## 2024-06-07 DIAGNOSIS — N20.1 URETEROLITHIASIS: Primary | ICD-10-CM

## 2024-06-07 LAB
ALBUMIN SERPL-MCNC: 4.6 G/DL (ref 3.5–5.2)
ALBUMIN/GLOB SERPL: 2 G/DL
ALP SERPL-CCNC: 69 U/L (ref 39–117)
ALT SERPL W P-5'-P-CCNC: 18 U/L (ref 1–41)
ANION GAP SERPL CALCULATED.3IONS-SCNC: 13 MMOL/L (ref 5–15)
AST SERPL-CCNC: 23 U/L (ref 1–40)
BASOPHILS # BLD AUTO: 0.02 10*3/MM3 (ref 0–0.2)
BASOPHILS NFR BLD AUTO: 0.2 % (ref 0–1.5)
BILIRUB SERPL-MCNC: 0.4 MG/DL (ref 0–1.2)
BILIRUB UR QL STRIP: NEGATIVE
BUN SERPL-MCNC: 25 MG/DL (ref 8–23)
BUN/CREAT SERPL: 20 (ref 7–25)
CALCIUM SPEC-SCNC: 9.7 MG/DL (ref 8.6–10.5)
CHLORIDE SERPL-SCNC: 108 MMOL/L (ref 98–107)
CLARITY UR: CLEAR
CO2 SERPL-SCNC: 22 MMOL/L (ref 22–29)
COLOR UR: YELLOW
CREAT SERPL-MCNC: 1.25 MG/DL (ref 0.76–1.27)
DEPRECATED RDW RBC AUTO: 43.4 FL (ref 37–54)
EGFRCR SERPLBLD CKD-EPI 2021: 61.6 ML/MIN/1.73
EOSINOPHIL # BLD AUTO: 0.12 10*3/MM3 (ref 0–0.4)
EOSINOPHIL NFR BLD AUTO: 1.3 % (ref 0.3–6.2)
ERYTHROCYTE [DISTWIDTH] IN BLOOD BY AUTOMATED COUNT: 12.4 % (ref 12.3–15.4)
GLOBULIN UR ELPH-MCNC: 2.3 GM/DL
GLUCOSE SERPL-MCNC: 124 MG/DL (ref 65–99)
GLUCOSE UR STRIP-MCNC: NEGATIVE MG/DL
HCT VFR BLD AUTO: 41.5 % (ref 37.5–51)
HGB BLD-MCNC: 13.3 G/DL (ref 13–17.7)
HGB UR QL STRIP.AUTO: NEGATIVE
IMM GRANULOCYTES # BLD AUTO: 0.05 10*3/MM3 (ref 0–0.05)
IMM GRANULOCYTES NFR BLD AUTO: 0.6 % (ref 0–0.5)
KETONES UR QL STRIP: ABNORMAL
LEUKOCYTE ESTERASE UR QL STRIP.AUTO: NEGATIVE
LIPASE SERPL-CCNC: 54 U/L (ref 13–60)
LYMPHOCYTES # BLD AUTO: 1.9 10*3/MM3 (ref 0.7–3.1)
LYMPHOCYTES NFR BLD AUTO: 21.1 % (ref 19.6–45.3)
MCH RBC QN AUTO: 30.4 PG (ref 26.6–33)
MCHC RBC AUTO-ENTMCNC: 32 G/DL (ref 31.5–35.7)
MCV RBC AUTO: 94.7 FL (ref 79–97)
MONOCYTES # BLD AUTO: 0.66 10*3/MM3 (ref 0.1–0.9)
MONOCYTES NFR BLD AUTO: 7.3 % (ref 5–12)
NEUTROPHILS NFR BLD AUTO: 6.25 10*3/MM3 (ref 1.7–7)
NEUTROPHILS NFR BLD AUTO: 69.5 % (ref 42.7–76)
NITRITE UR QL STRIP: NEGATIVE
NRBC BLD AUTO-RTO: 0 /100 WBC (ref 0–0.2)
PH UR STRIP.AUTO: 5.5 [PH] (ref 5–8)
PLATELET # BLD AUTO: 177 10*3/MM3 (ref 140–450)
PMV BLD AUTO: 10 FL (ref 6–12)
POTASSIUM SERPL-SCNC: 4 MMOL/L (ref 3.5–5.2)
PROT SERPL-MCNC: 6.9 G/DL (ref 6–8.5)
PROT UR QL STRIP: NEGATIVE
RBC # BLD AUTO: 4.38 10*6/MM3 (ref 4.14–5.8)
SODIUM SERPL-SCNC: 143 MMOL/L (ref 136–145)
SP GR UR STRIP: 1.02 (ref 1–1.03)
UROBILINOGEN UR QL STRIP: ABNORMAL
WBC NRBC COR # BLD AUTO: 9 10*3/MM3 (ref 3.4–10.8)

## 2024-06-07 PROCEDURE — 80053 COMPREHEN METABOLIC PANEL: CPT | Performed by: EMERGENCY MEDICINE

## 2024-06-07 PROCEDURE — 85025 COMPLETE CBC W/AUTO DIFF WBC: CPT | Performed by: EMERGENCY MEDICINE

## 2024-06-07 PROCEDURE — 83690 ASSAY OF LIPASE: CPT | Performed by: EMERGENCY MEDICINE

## 2024-06-07 PROCEDURE — 25010000002 KETOROLAC TROMETHAMINE PER 15 MG: Performed by: EMERGENCY MEDICINE

## 2024-06-07 PROCEDURE — 25010000002 HYDROMORPHONE 1 MG/ML SOLUTION: Performed by: EMERGENCY MEDICINE

## 2024-06-07 PROCEDURE — 74176 CT ABD & PELVIS W/O CONTRAST: CPT

## 2024-06-07 PROCEDURE — 99284 EMERGENCY DEPT VISIT MOD MDM: CPT

## 2024-06-07 PROCEDURE — 81003 URINALYSIS AUTO W/O SCOPE: CPT | Performed by: EMERGENCY MEDICINE

## 2024-06-07 PROCEDURE — 96375 TX/PRO/DX INJ NEW DRUG ADDON: CPT

## 2024-06-07 PROCEDURE — 96374 THER/PROPH/DIAG INJ IV PUSH: CPT

## 2024-06-07 PROCEDURE — 25010000002 ONDANSETRON PER 1 MG: Performed by: EMERGENCY MEDICINE

## 2024-06-07 RX ORDER — TAMSULOSIN HYDROCHLORIDE 0.4 MG/1
1 CAPSULE ORAL NIGHTLY
Qty: 10 CAPSULE | Refills: 0 | Status: SHIPPED | OUTPATIENT
Start: 2024-06-07

## 2024-06-07 RX ORDER — ONDANSETRON 4 MG/1
4 TABLET, ORALLY DISINTEGRATING ORAL EVERY 8 HOURS PRN
Qty: 10 TABLET | Refills: 0 | Status: SHIPPED | OUTPATIENT
Start: 2024-06-07

## 2024-06-07 RX ORDER — ONDANSETRON 2 MG/ML
4 INJECTION INTRAMUSCULAR; INTRAVENOUS ONCE
Status: COMPLETED | OUTPATIENT
Start: 2024-06-07 | End: 2024-06-07

## 2024-06-07 RX ORDER — HYDROCODONE BITARTRATE AND ACETAMINOPHEN 5; 325 MG/1; MG/1
1 TABLET ORAL EVERY 6 HOURS PRN
Qty: 12 TABLET | Refills: 0 | Status: SHIPPED | OUTPATIENT
Start: 2024-06-07

## 2024-06-07 RX ORDER — KETOROLAC TROMETHAMINE 30 MG/ML
15 INJECTION, SOLUTION INTRAMUSCULAR; INTRAVENOUS ONCE
Status: COMPLETED | OUTPATIENT
Start: 2024-06-07 | End: 2024-06-07

## 2024-06-07 RX ORDER — CEFDINIR 300 MG/1
300 CAPSULE ORAL 2 TIMES DAILY
Qty: 10 CAPSULE | Refills: 0 | Status: SHIPPED | OUTPATIENT
Start: 2024-06-07 | End: 2024-06-12

## 2024-06-07 RX ORDER — SODIUM CHLORIDE 0.9 % (FLUSH) 0.9 %
10 SYRINGE (ML) INJECTION AS NEEDED
Status: DISCONTINUED | OUTPATIENT
Start: 2024-06-07 | End: 2024-06-07 | Stop reason: HOSPADM

## 2024-06-07 RX ADMIN — ONDANSETRON 4 MG: 2 INJECTION INTRAMUSCULAR; INTRAVENOUS at 01:29

## 2024-06-07 RX ADMIN — KETOROLAC TROMETHAMINE 15 MG: 30 INJECTION, SOLUTION INTRAMUSCULAR at 03:02

## 2024-06-07 RX ADMIN — HYDROMORPHONE HYDROCHLORIDE 1 MG: 1 INJECTION, SOLUTION INTRAMUSCULAR; INTRAVENOUS; SUBCUTANEOUS at 01:29

## 2024-06-07 NOTE — DISCHARGE INSTRUCTIONS
Follow-up with urology as directed.  Return to the emergency room for any new or worsening symptoms or if you have any other questions or concerns.  Take medications as prescribed.  Do not drive or drink alcohol while taking pain medication.  Drink plenty of fluids.

## 2024-06-07 NOTE — ED PROVIDER NOTES
Subjective   History of Present Illness  Chief complaint: Abdominal pain    69-year-old male presents with abdominal pain.  Patient states pain is in the left lower abdomen and radiates up to the left flank.  Pain started about 2 hours ago.  He has had nausea but no vomiting or diarrhea.  He has had urinary frequency.  He denies any dysuria or hematuria.  He denies fever.  Patient does report history of kidney stones and this feels similar.    History provided by:  Patient      Review of Systems   Constitutional:  Negative for fever.   HENT:  Negative for congestion.    Respiratory:  Negative for cough and shortness of breath.    Cardiovascular:  Negative for chest pain.   Gastrointestinal:  Positive for abdominal pain and nausea. Negative for diarrhea and vomiting.   Genitourinary:  Positive for flank pain and urgency. Negative for dysuria and hematuria.   Musculoskeletal:  Positive for back pain.   Neurological:  Negative for headaches.       Past Medical History:   Diagnosis Date    Abnormal EKG     Allergies     Ankle joint loose body     Ankle pain, left     Arthritis     Arthritis of right foot     Blood in stool     Chest pain     Difficulty walking     Elevated LFTs     Fatigue     Foot pain     rt    GERD (gastroesophageal reflux disease)     Gout     H. pylori infection     H/O class III angina pectoris     Hip pain     Hyperlipidemia     Kidney stones     Leg pain     Low back pain     Macular degeneration     Malaise and fatigue     Myalgia     and/or myositis    Nondisplaced dome fracture of left talus, initial encounter for closed fracture     Prostatic hypertrophy     Benign prostatic hypertrophy    Sleep disorder        No Known Allergies    Past Surgical History:   Procedure Laterality Date    ANKLE OPEN REDUCTION INTERNAL FIXATION  1976    ANKLE SURGERY      1978,1979    CATARACT EXTRACTION, BILATERAL      implants,cataracts bilateral eyes    FOOT FRACTURE SURGERY  1977    FOOT SURGERY  1976     "OTHER SURGICAL HISTORY  2009    Stress Test       Family History   Problem Relation Age of Onset    Colon cancer Other     Uterine cancer Other     Colon cancer Mother     Cancer Mother     Alcohol abuse Father        Social History     Socioeconomic History    Marital status:    Tobacco Use    Smoking status: Never     Passive exposure: Never    Smokeless tobacco: Never   Vaping Use    Vaping status: Never Used   Substance and Sexual Activity    Alcohol use: Yes     Alcohol/week: 3.0 standard drinks of alcohol     Types: 3 Cans of beer per week    Drug use: Never    Sexual activity: Yes     Partners: Female     Birth control/protection: Surgical       /70   Pulse 68   Temp 97.8 °F (36.6 °C) (Oral)   Resp 20   Ht 179.1 cm (70.5\")   Wt 79.4 kg (175 lb)   SpO2 99%   BMI 24.75 kg/m²       Objective   Physical Exam  Vitals and nursing note reviewed.   Constitutional:       Appearance: He is well-developed.   HENT:      Head: Normocephalic and atraumatic.      Mouth/Throat:      Mouth: Mucous membranes are moist.   Cardiovascular:      Rate and Rhythm: Normal rate and regular rhythm.      Heart sounds: Normal heart sounds.   Pulmonary:      Effort: Pulmonary effort is normal. No respiratory distress.      Breath sounds: Normal breath sounds.   Abdominal:      General: Bowel sounds are normal.      Palpations: Abdomen is soft.      Tenderness: There is abdominal tenderness in the left lower quadrant. There is no right CVA tenderness, left CVA tenderness, guarding or rebound.   Skin:     General: Skin is warm and dry.   Neurological:      Mental Status: He is alert and oriented to person, place, and time.         Procedures           ED Course      Results for orders placed or performed during the hospital encounter of 06/07/24   Comprehensive Metabolic Panel    Specimen: Blood   Result Value Ref Range    Glucose 124 (H) 65 - 99 mg/dL    BUN 25 (H) 8 - 23 mg/dL    Creatinine 1.25 0.76 - 1.27 mg/dL    " Sodium 143 136 - 145 mmol/L    Potassium 4.0 3.5 - 5.2 mmol/L    Chloride 108 (H) 98 - 107 mmol/L    CO2 22.0 22.0 - 29.0 mmol/L    Calcium 9.7 8.6 - 10.5 mg/dL    Total Protein 6.9 6.0 - 8.5 g/dL    Albumin 4.6 3.5 - 5.2 g/dL    ALT (SGPT) 18 1 - 41 U/L    AST (SGOT) 23 1 - 40 U/L    Alkaline Phosphatase 69 39 - 117 U/L    Total Bilirubin 0.4 0.0 - 1.2 mg/dL    Globulin 2.3 gm/dL    A/G Ratio 2.0 g/dL    BUN/Creatinine Ratio 20.0 7.0 - 25.0    Anion Gap 13.0 5.0 - 15.0 mmol/L    eGFR 61.6 >60.0 mL/min/1.73   Lipase    Specimen: Blood   Result Value Ref Range    Lipase 54 13 - 60 U/L   Urinalysis With Culture If Indicated - Urine, Clean Catch    Specimen: Urine, Clean Catch   Result Value Ref Range    Color, UA Yellow Yellow, Straw    Appearance, UA Clear Clear    pH, UA 5.5 5.0 - 8.0    Specific Gravity, UA 1.021 1.005 - 1.030    Glucose, UA Negative Negative    Ketones, UA 40 mg/dL (2+) (A) Negative    Bilirubin, UA Negative Negative    Blood, UA Negative Negative    Protein, UA Negative Negative    Leuk Esterase, UA Negative Negative    Nitrite, UA Negative Negative    Urobilinogen, UA 1.0 E.U./dL 0.2 - 1.0 E.U./dL   CBC Auto Differential    Specimen: Blood   Result Value Ref Range    WBC 9.00 3.40 - 10.80 10*3/mm3    RBC 4.38 4.14 - 5.80 10*6/mm3    Hemoglobin 13.3 13.0 - 17.7 g/dL    Hematocrit 41.5 37.5 - 51.0 %    MCV 94.7 79.0 - 97.0 fL    MCH 30.4 26.6 - 33.0 pg    MCHC 32.0 31.5 - 35.7 g/dL    RDW 12.4 12.3 - 15.4 %    RDW-SD 43.4 37.0 - 54.0 fl    MPV 10.0 6.0 - 12.0 fL    Platelets 177 140 - 450 10*3/mm3    Neutrophil % 69.5 42.7 - 76.0 %    Lymphocyte % 21.1 19.6 - 45.3 %    Monocyte % 7.3 5.0 - 12.0 %    Eosinophil % 1.3 0.3 - 6.2 %    Basophil % 0.2 0.0 - 1.5 %    Immature Grans % 0.6 (H) 0.0 - 0.5 %    Neutrophils, Absolute 6.25 1.70 - 7.00 10*3/mm3    Lymphocytes, Absolute 1.90 0.70 - 3.10 10*3/mm3    Monocytes, Absolute 0.66 0.10 - 0.90 10*3/mm3    Eosinophils, Absolute 0.12 0.00 - 0.40 10*3/mm3     Basophils, Absolute 0.02 0.00 - 0.20 10*3/mm3    Immature Grans, Absolute 0.05 0.00 - 0.05 10*3/mm3    nRBC 0.0 0.0 - 0.2 /100 WBC     CT Abdomen Pelvis Without Contrast    Result Date: 6/7/2024  Impression: Obstructing calculus within the distal left ureter measuring up to 3 mm with mild to moderate proximal hydroureteronephrosis. Electronically Signed: Ana Yo MD  6/7/2024 2:06 AM EDT  Workstation ID: [id number]                                          Medical Decision Making  Amount and/or Complexity of Data Reviewed  Labs: ordered.  Radiology: ordered.    Risk  Prescription drug management.      Patient had the above evaluation.  Results were discussed with the patient.  White blood cell count is normal.  CMP and lipase are unremarkable.  Urinalysis shows no UTI.  CT of the abdomen pelvis shows an obstructing calculus in the distal left ureter measuring up to 3 mm with mild to moderate hydronephrosis.  Patient is feeling much better after ER therapy.  He will be discharged with prescriptions for Norco, Zofran, cefdinir, flomax.  He will follow-up with urology.      Final diagnoses:   Ureterolithiasis       ED Disposition  ED Disposition       ED Disposition   Discharge    Condition   Stable    Comment   --               Carrillo Wynn MD  [address] [place] [address] [place] [facility]  [phone]    Call in 1 day           Medication List        New Prescriptions      cefdinir 300 MG capsule  Commonly known as: OMNICEF  Take 1 capsule by mouth 2 (Two) Times a Day for 5 days.     HYDROcodone-acetaminophen 5-325 MG per tablet  Commonly known as: NORCO  Take 1 tablet by mouth Every 6 (Six) Hours As Needed for Moderate Pain.     ondansetron ODT 4 MG disintegrating tablet  Commonly known as: ZOFRAN-ODT  Place 1 tablet on the tongue Every 8 (Eight) Hours As Needed for Nausea or Vomiting.     tamsulosin 0.4 MG capsule 24 hr capsule  Commonly known as: FLOMAX  Take 1 capsule by mouth Every Night.                Where to Get Your Medications        These medications were sent to [id number] - [place], [place] - [address] - [phone] PH - [phone] FX  [address], [place] [address]      Phone: [phone]   cefdinir 300 MG capsule  HYDROcodone-acetaminophen 5-325 MG per tablet  ondansetron ODT 4 MG disintegrating tablet  tamsulosin 0.4 MG capsule 24 hr capsule            Clint Alvarado MD  06/07/24 022

## 2024-06-18 DIAGNOSIS — F41.9 ANXIETY: ICD-10-CM

## 2024-06-19 RX ORDER — ALPRAZOLAM 1 MG/1
0.5 TABLET ORAL
Qty: 30 TABLET | Refills: 3 | Status: SHIPPED | OUTPATIENT
Start: 2024-06-19

## 2024-06-26 ENCOUNTER — OFFICE VISIT (OUTPATIENT)
Dept: FAMILY MEDICINE CLINIC | Facility: CLINIC | Age: 69
End: 2024-06-26
Payer: MEDICARE

## 2024-06-26 VITALS
RESPIRATION RATE: 16 BRPM | HEART RATE: 74 BPM | HEIGHT: 71 IN | SYSTOLIC BLOOD PRESSURE: 118 MMHG | DIASTOLIC BLOOD PRESSURE: 72 MMHG | OXYGEN SATURATION: 100 % | WEIGHT: 173 LBS | BODY MASS INDEX: 24.22 KG/M2

## 2024-06-26 DIAGNOSIS — Z13.0 SCREENING FOR DEFICIENCY ANEMIA: ICD-10-CM

## 2024-06-26 DIAGNOSIS — R73.01 IMPAIRED FASTING GLUCOSE: ICD-10-CM

## 2024-06-26 DIAGNOSIS — N20.0 KIDNEY STONES: ICD-10-CM

## 2024-06-26 DIAGNOSIS — E55.9 VITAMIN D DEFICIENCY: ICD-10-CM

## 2024-06-26 DIAGNOSIS — Z13.220 LIPID SCREENING: ICD-10-CM

## 2024-06-26 DIAGNOSIS — Z12.5 PROSTATE CANCER SCREENING: ICD-10-CM

## 2024-06-26 DIAGNOSIS — E78.41 ELEVATED LIPOPROTEIN(A): ICD-10-CM

## 2024-06-26 DIAGNOSIS — Z00.00 PREVENTATIVE HEALTH CARE: Primary | ICD-10-CM

## 2024-06-26 DIAGNOSIS — E53.8 VITAMIN B12 DEFICIENCY: ICD-10-CM

## 2024-06-26 PROCEDURE — G0439 PPPS, SUBSEQ VISIT: HCPCS | Performed by: INTERNAL MEDICINE

## 2024-06-26 PROCEDURE — 1125F AMNT PAIN NOTED PAIN PRSNT: CPT | Performed by: INTERNAL MEDICINE

## 2024-06-26 PROCEDURE — 99214 OFFICE O/P EST MOD 30 MIN: CPT | Performed by: INTERNAL MEDICINE

## 2024-06-26 PROCEDURE — 1170F FXNL STATUS ASSESSED: CPT | Performed by: INTERNAL MEDICINE

## 2024-06-26 NOTE — PROGRESS NOTES
Chief Complaint  No chief complaint on file.    HPI:    Joe Noel presents to [facility]    Patient presenting for Medicare Wellness Visit. He has been taking care of his aunt who had a lot of health conditions. She recently passed within the last week. Patient reports that his mood has overall been slightly down due to taking care of his aunt, son with CP. He reports that he is tired due to all the stress. Currently on BuSpar 10 mg twice daily and Xanax 0.5 mg nightly with stable mood.  He has tried other medication issues without success.     Recently seen in ED on 6/7/2024 due to nonbstructing nephrolithiasis. He was discharged with prescriptions for Norco, Zofran, cefdinir, flomax.  He will follow-up with urology. Symptoms have improved since ED visit. Currently asymptomatic.     Hyperlipidemia  Rosuvastatin 10 mg daily. Compliant with statin. No side effects.     GERD  Previously on medications, not currently. Occasionally will get mild symptoms especially when laying down at night.      Gout/kidney stones  Currently on allopurinol 300 mg daily.  No recent gout flares.     Vitamin B12 deficiency  Previously on vitamin B-12 injection 100 mcg q28 days. He has not been getting injections recently.    Preventative: Most recent preventative care visit with Medicare wellness visit on 6/20/2023.     Social: Recently lost aunt    Diet and Exercise: Overall good and does walking with golf.     Alcohol, Tobacco, and Recreational Drug use: Rare alcohol    Cancer screenings:  PSA: Most recent 0.51 on 6/16/2023  Colonoscopy: Most recent 3/16/2023 notable for polyps in the ascending and transverse colon.  Repeat recommended in 5 years (2023).     Immunizations: Due for RSV,     [employer] Directive: Not on file      Review of Systems:  ROS negative unless otherwise noted in HPI above.    Past Medical History:   Diagnosis Date    Abnormal EKG     Allergies     Ankle joint  loose body     Ankle pain, left     Arthritis     Arthritis of right foot     Blood in stool     Chest pain     Difficulty walking     Elevated LFTs     Fatigue     Foot pain     rt    GERD (gastroesophageal reflux disease)     Gout     H. pylori infection     H/O class III angina pectoris     Hip pain     Hyperlipidemia     Kidney stones     Leg pain     Low back pain     Macular degeneration     Malaise and fatigue     Myalgia     and/or myositis    Nondisplaced dome fracture of left talus, initial encounter for closed fracture     Prostatic hypertrophy     Benign prostatic hypertrophy    Sleep disorder          Current Outpatient Medications:     allopurinol (ZYLOPRIM) 300 MG tablet, TAKE 1 TABLET DAILY, Disp: 90 tablet, Rfl: 0    ALPRAZolam (XANAX) 1 MG tablet, Take 0.5 tablets by mouth every night at bedtime., Disp: 30 tablet, Rfl: 3    busPIRone (BUSPAR) 10 MG tablet, TAKE 1 TABLET BY MOUTH TWICE DAILY AS NEEDED FOR ANXIETY, Disp: 60 tablet, Rfl: 3    HYDROcodone-acetaminophen (NORCO) 5-325 MG per tablet, Take 1 tablet by mouth Every 6 (Six) Hours As Needed for Moderate Pain., Disp: 12 tablet, Rfl: 0    ondansetron ODT (ZOFRAN-ODT) 4 MG disintegrating tablet, Place 1 tablet on the tongue Every 8 (Eight) Hours As Needed for Nausea or Vomiting., Disp: 10 tablet, Rfl: 0    rosuvastatin (CRESTOR) 10 MG tablet, Take 1 tablet by mouth Daily., Disp: 90 tablet, Rfl: 3    tamsulosin (FLOMAX) 0.4 MG capsule 24 hr capsule, Take 1 capsule by mouth Every Night., Disp: 10 capsule, Rfl: 0    VITAMIN D PO, Take  by mouth., Disp: , Rfl:     Xiidra 5 % ophthalmic solution, , Disp: , Rfl:     Current Facility-Administered Medications:     cyanocobalamin injection 1,000 mcg, 1,000 mcg, Intramuscular, Q28 Days, Niurka Barrios MD, 1,000 mcg at 05/20/21 1436    cyanocobalamin injection 1,000 mcg, 1,000 mcg, Intramuscular, Q28 Days, Niurka Barrios MD, 1,000 mcg at 06/17/22 1628    cyanocobalamin injection 1,000 mcg,  "1,000 mcg, Intramuscular, Q28 Days, Niurka Barrios MD, 1,000 mcg at 06/20/23 1531    Social History     Socioeconomic History    Marital status:    Tobacco Use    Smoking status: Never     Passive exposure: Never    Smokeless tobacco: Never   Vaping Use    Vaping status: Never Used   Substance and Sexual Activity    Alcohol use: Yes     Alcohol/week: 3.0 standard drinks of alcohol     Types: 3 Cans of beer per week    Drug use: Never    Sexual activity: Yes     Partners: Female     Birth control/protection: Surgical        Objective   Vital Signs:  There were no vitals taken for this visit.  Estimated body mass index is 24.75 kg/m² as calculated from the following:    Height as of 6/7/24: 179.1 cm (70.5\").    Weight as of 6/7/24: 79.4 kg (175 lb).    Physical Exam:  General: Well-appearing patient, no apparent distress  HEENT: No posterior pharynx erythema, no tonsillar erythema or exudates, normal external auditory canals, TM normal without bulging or erythema  Neck: No cervical lymphadenopathy  Cardiac: Regular rate and rhythm, normal S1/S2, no murmur, rubs or gallops, no lower extremity edema  Lungs: Clear to auscultation bilaterally, no crackles or wheezes  Abdomen: Soft, non-tender, no guarding or rebound tenderness, no hepatosplenomegaly  Skin: No significant rashes or lesions  MSK: Grossly normal tone and strength  Neuro: Alert and oriented x3, CN II-XII grossly intact, normal gait and balance  Psych: Appropriate mood and affect    Assessment and Plan:    (Z00.00) Preventative health care  Patient is a 69 year old male who is overall doing well. Reviewed social and family history. Encouraged increased healthy diet and exercise and discussed importance to overall health.  Updating age and gender appropriate cancer screenings with repeat PSA. Discussed indicated vaccines based on age and comorbidities. No skin, mood concerns.  Advance healthcare directive information provided  Plan:  - Encourage " healthy diet and exercise  - Vaccines up-to-date  - Screening labs as ordered  - Update cancer screening as below    (E53.8) Vitamin B12 deficiency -   Assessment: Previously on vitamin B12 injections and has not recently received.  No recent vitamin B12 level on record.  Plan:   - Vitamin B12  - Consider resuming vitamin B12 injections depending on vitamin B12 level    (E55.9) Vitamin D deficiency -   Assessment: Previously on supplementation.  No recent vitamin D level.  Plan:   - Vitamin D 25 hydroxy  -Consider vitamin D replacement based on repeat level    (N20.0) Kidney stones -   Assessment: Patient overall significantly improved since recent ED visit for kidney stone.  Currently asymptomatic.  Due to follow-up with urology in near future for additional considerations.  Plan:  - Basic metabolic panel  - Stay well-hydrated  - Follow-up with urology as scheduled    GERD  Assessment: Symptoms very intermittent and mild off medications.  No red flag symptoms.  Plan:   - Avoid potential triggers  - Consider OTC medications as needed    (R73.01) Impaired fasting glucose -   Assessment: Previously slightly elevated fasting blood glucose.  No recent repeat on file.  Plan:   - Basic metabolic panel   -Discussed the importance of healthy diet and exercise    (Z13.220) Lipid screening - Plan: Lipid panel    (Z13.0) Screening for deficiency anemia - Plan: CBC & Differential    (Z12.5) Prostate cancer screening - Plan: PSA SCREENING    Patient was given instructions and counseling regarding his condition or for health maintenance advice. Please see specific information pulled into the AVS if appropriate.       Dr Dennys Mathew   Internal Medicine Physician  [facility]  [address], [address]  [place] [id number]

## 2024-06-26 NOTE — PATIENT INSTRUCTIONS
Please schedule fasting lab only appointment or can get done at [facility]    Up to date with vaccines     Medications:  Continue current medications as prescribed    Follow up with urology as scheduled    Encourage healthy diet and exercise    Follow up in one year or sooner if something arises

## 2024-06-26 NOTE — PROGRESS NOTES
The ABCs of the Annual Wellness Visit  Subsequent Medicare Wellness Visit    Subjective    Joe Noel is a 69 y.o. male who presents for a Subsequent Medicare Wellness Visit.    The following portions of the patient's history were reviewed and   updated as appropriate: allergies, current medications, past family history, past medical history, past social history, past surgical history, and problem list.    Compared to one year ago, the patient feels his physical   health is the same.    Compared to one year ago, the patient feels his mental   health is worse.    Recent Hospitalizations:  He was not admitted to the hospital during the last year.       Current Medical Providers:  Patient Care Team:  Dennys Mathew MD as PCP - General (Internal Medicine)    Outpatient Medications Prior to Visit   Medication Sig Dispense Refill    allopurinol (ZYLOPRIM) 300 MG tablet TAKE 1 TABLET DAILY 90 tablet 0    ALPRAZolam (XANAX) 1 MG tablet Take 0.5 tablets by mouth every night at bedtime. 30 tablet 3    busPIRone (BUSPAR) 10 MG tablet TAKE 1 TABLET BY MOUTH TWICE DAILY AS NEEDED FOR ANXIETY 60 tablet 3    rosuvastatin (CRESTOR) 10 MG tablet Take 1 tablet by mouth Daily. 90 tablet 3    VITAMIN D PO Take  by mouth.      Xiidra 5 % ophthalmic solution       HYDROcodone-acetaminophen (NORCO) 5-325 MG per tablet Take 1 tablet by mouth Every 6 (Six) Hours As Needed for Moderate Pain. (Patient not taking: Reported on 6/26/2024) 12 tablet 0    ondansetron ODT (ZOFRAN-ODT) 4 MG disintegrating tablet Place 1 tablet on the tongue Every 8 (Eight) Hours As Needed for Nausea or Vomiting. (Patient not taking: Reported on 6/26/2024) 10 tablet 0    tamsulosin (FLOMAX) 0.4 MG capsule 24 hr capsule Take 1 capsule by mouth Every Night. 10 capsule 0     Facility-Administered Medications Prior to Visit   Medication Dose Route Frequency Provider Last Rate Last Admin    cyanocobalamin injection 1,000 mcg  1,000 mcg Intramuscular Q28 Days  "Niurka Barrios MD   1,000 mcg at 05/20/21 1436    cyanocobalamin injection 1,000 mcg  1,000 mcg Intramuscular Q28 Days Niurka Barrios MD   1,000 mcg at 06/17/22 1628    cyanocobalamin injection 1,000 mcg  1,000 mcg Intramuscular Q28 Days Niurka Barrios MD   1,000 mcg at 06/20/23 1531       Opioid medication/s are on active medication list.  and I have evaluated his active treatment plan and pain score trends (see table).  There were no vitals filed for this visit.  I have reviewed the chart for potential of high risk medication and harmful drug interactions in the elderly.          Aspirin is not on active medication list.  Aspirin use is not indicated based on review of current medical condition/s. Risk of harm outweighs potential benefits.  .    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  ACP discussion was held with the patient during this visit. Patient does not have an advance directive, information provided.     Objective    Vitals:    06/26/24 0900   BP: 118/72   Pulse: 74   Resp: 16   SpO2: 100%   Weight: 78.5 kg (173 lb)   Height: 179.1 cm (70.5\")     Estimated body mass index is 24.47 kg/m² as calculated from the " "following:    Height as of this encounter: 179.1 cm (70.5\").    Weight as of this encounter: 78.5 kg (173 lb).    BMI is within normal parameters. No other follow-up for BMI required.      Does the patient have evidence of cognitive impairment? No          HEALTH RISK ASSESSMENT    Smoking Status:  Social History     Tobacco Use   Smoking Status Never    Passive exposure: Never   Smokeless Tobacco Never     Alcohol Consumption:  Social History     Substance and Sexual Activity   Alcohol Use Yes    Alcohol/week: 3.0 standard drinks of alcohol    Types: 3 Cans of beer per week     Fall Risk Screen:    STEADI Fall Risk Assessment was completed, and patient is at LOW risk for falls.Assessment completed on:2024    Depression Screenin/26/2024     8:55 AM   PHQ-2/PHQ-9 Depression Screening   Little Interest or Pleasure in Doing Things 3-->nearly every day   Feeling Down, Depressed or Hopeless 3-->nearly every day   Trouble Falling or Staying Asleep, or Sleeping Too Much 3-->nearly every day   Feeling Tired or Having Little Energy 3-->nearly every day   Poor Appetite or Overeating 0-->not at all   Feeling Bad about Yourself - or that You are a Failure or Have Let Yourself or Your Family Down 3-->nearly every day   Trouble Concentrating on Things, Such as Reading the Newspaper or Watching Television 3-->nearly every day   Moving or Speaking So Slowly that Other People Could Have Noticed? Or the Opposite - Being So Fidgety 1-->several days   Thoughts that You Would be Better Off Dead or of Hurting Yourself in Some Way 0-->not at all   PHQ-9: Brief Depression Severity Measure Score 19       Health Habits and Functional and Cognitive Screenin/26/2024     8:59 AM   Functional & Cognitive Status   Do you have difficulty preparing food and eating? No   Do you have difficulty bathing yourself, getting dressed or grooming yourself? No   Do you have difficulty using the toilet? No   Do you have difficulty " moving around from place to place? No   Do you have trouble with steps or getting out of a bed or a chair? No   Current Diet Limited Junk Food   Dental Exam Up to date   Eye Exam Up to date   Exercise (times per week) 3 times per week   Current Exercises Include Light Weights;Walking;Yard Work   Do you need help using the phone?  No   Are you deaf or do you have serious difficulty hearing?  No   Do you need help to go to places out of walking distance? No   Do you need help shopping? No   Do you need help preparing meals?  No   Do you need help with housework?  No   Do you need help with laundry? No   Do you need help taking your medications? No   Do you need help managing money? No   Do you ever drive or ride in a car without wearing a seat belt? No   Have you felt unusual stress, anger or loneliness in the last month? Yes   Who do you live with? Spouse   If you need help, do you have trouble finding someone available to you? No   Have you been bothered in the last four weeks by sexual problems? No   Do you have difficulty concentrating, remembering or making decisions? Yes     Normal gait and balance    Age-appropriate Screening Schedule:  Refer to the list below for future screening recommendations based on patient's age, sex and/or medical conditions. Orders for these recommended tests are listed in the plan section. The patient has been provided with a written plan.    Health Maintenance   Topic Date Due    RSV Vaccine - Adults (1 - 1-dose 60+ series) Never done    ZOSTER VACCINE (3 of 3) 01/16/2024    LIPID PANEL  06/16/2024    COVID-19 Vaccine (7 - 2023-24 season) 09/15/2024 (Originally 2/3/2024)    HEPATITIS C SCREENING  06/14/2027 (Originally 9/23/2019)    INFLUENZA VACCINE  08/01/2024    ANNUAL WELLNESS VISIT  06/26/2025    COLORECTAL CANCER SCREENING  03/16/2028    TDAP/TD VACCINES (2 - Td or Tdap) 05/20/2031    Pneumococcal Vaccine 65+  Completed                  CMS Preventative Services Quick  Reference  Risk Factors Identified During Encounter  None Identified  The above risks/problems have been discussed with the patient.  Pertinent information has been shared with the patient in the After Visit Summary.  An After Visit Summary and PPPS were made available to the patient.    Follow Up:   Next Medicare Wellness visit to be scheduled in 1 year.     Dr Dennys Mathew   Internal Medicine Physician  [facility]--[address], [address]  [place] [address]

## 2024-07-01 ENCOUNTER — PATIENT ROUNDING (BHMG ONLY) (OUTPATIENT)
Dept: FAMILY MEDICINE CLINIC | Facility: CLINIC | Age: 69
End: 2024-07-01
Payer: MEDICARE

## 2024-07-01 NOTE — PROGRESS NOTES
July 1, 2024      A [employer] message was sent to Joe Noel inquiring about patient's experience at our office during a recent visit.      RAJEEV [place]  [facility]  [address]  [place] [id number].

## 2024-07-05 ENCOUNTER — LAB (OUTPATIENT)
Dept: FAMILY MEDICINE CLINIC | Facility: CLINIC | Age: 69
End: 2024-07-05
Payer: MEDICARE

## 2024-07-05 DIAGNOSIS — E53.8 VITAMIN B12 DEFICIENCY: ICD-10-CM

## 2024-07-05 DIAGNOSIS — Z13.220 LIPID SCREENING: ICD-10-CM

## 2024-07-05 DIAGNOSIS — N20.0 KIDNEY STONES: ICD-10-CM

## 2024-07-05 DIAGNOSIS — Z12.5 PROSTATE CANCER SCREENING: ICD-10-CM

## 2024-07-05 DIAGNOSIS — E55.9 VITAMIN D DEFICIENCY: ICD-10-CM

## 2024-07-05 DIAGNOSIS — R73.01 IMPAIRED FASTING GLUCOSE: ICD-10-CM

## 2024-07-05 LAB
25(OH)D3 SERPL-MCNC: 46.5 NG/ML (ref 30–100)
ANION GAP SERPL CALCULATED.3IONS-SCNC: 9 MMOL/L (ref 5–15)
BASOPHILS # BLD AUTO: 0.01 10*3/MM3 (ref 0–0.2)
BASOPHILS NFR BLD AUTO: 0.2 % (ref 0–1.5)
BUN SERPL-MCNC: 16 MG/DL (ref 8–23)
BUN/CREAT SERPL: 15.5 (ref 7–25)
CALCIUM SPEC-SCNC: 9.5 MG/DL (ref 8.6–10.5)
CHLORIDE SERPL-SCNC: 107 MMOL/L (ref 98–107)
CHOLEST SERPL-MCNC: 129 MG/DL (ref 0–200)
CO2 SERPL-SCNC: 25 MMOL/L (ref 22–29)
CREAT SERPL-MCNC: 1.03 MG/DL (ref 0.76–1.27)
DEPRECATED RDW RBC AUTO: 40.8 FL (ref 37–54)
EGFRCR SERPLBLD CKD-EPI 2021: 77.7 ML/MIN/1.73
EOSINOPHIL # BLD AUTO: 0.1 10*3/MM3 (ref 0–0.4)
EOSINOPHIL NFR BLD AUTO: 1.9 % (ref 0.3–6.2)
ERYTHROCYTE [DISTWIDTH] IN BLOOD BY AUTOMATED COUNT: 11.9 % (ref 12.3–15.4)
GLUCOSE SERPL-MCNC: 88 MG/DL (ref 65–99)
HCT VFR BLD AUTO: 42.3 % (ref 37.5–51)
HDLC SERPL-MCNC: 66 MG/DL (ref 40–60)
HGB BLD-MCNC: 13.7 G/DL (ref 13–17.7)
IMM GRANULOCYTES # BLD AUTO: 0.05 10*3/MM3 (ref 0–0.05)
IMM GRANULOCYTES NFR BLD AUTO: 0.9 % (ref 0–0.5)
LDLC SERPL CALC-MCNC: 50 MG/DL (ref 0–100)
LDLC/HDLC SERPL: 0.78 {RATIO}
LYMPHOCYTES # BLD AUTO: 1.37 10*3/MM3 (ref 0.7–3.1)
LYMPHOCYTES NFR BLD AUTO: 25.4 % (ref 19.6–45.3)
MCH RBC QN AUTO: 30.4 PG (ref 26.6–33)
MCHC RBC AUTO-ENTMCNC: 32.4 G/DL (ref 31.5–35.7)
MCV RBC AUTO: 93.8 FL (ref 79–97)
MONOCYTES # BLD AUTO: 0.52 10*3/MM3 (ref 0.1–0.9)
MONOCYTES NFR BLD AUTO: 9.6 % (ref 5–12)
NEUTROPHILS NFR BLD AUTO: 3.35 10*3/MM3 (ref 1.7–7)
NEUTROPHILS NFR BLD AUTO: 62 % (ref 42.7–76)
NRBC BLD AUTO-RTO: 0 /100 WBC (ref 0–0.2)
PLATELET # BLD AUTO: 179 10*3/MM3 (ref 140–450)
PMV BLD AUTO: 10.3 FL (ref 6–12)
POTASSIUM SERPL-SCNC: 4.6 MMOL/L (ref 3.5–5.2)
PSA SERPL-MCNC: 0.68 NG/ML (ref 0–4)
RBC # BLD AUTO: 4.51 10*6/MM3 (ref 4.14–5.8)
SODIUM SERPL-SCNC: 141 MMOL/L (ref 136–145)
TRIGL SERPL-MCNC: 59 MG/DL (ref 0–150)
VIT B12 BLD-MCNC: 264 PG/ML (ref 211–946)
VLDLC SERPL-MCNC: 13 MG/DL (ref 5–40)
WBC NRBC COR # BLD AUTO: 5.4 10*3/MM3 (ref 3.4–10.8)

## 2024-07-05 PROCEDURE — 85025 COMPLETE CBC W/AUTO DIFF WBC: CPT | Performed by: INTERNAL MEDICINE

## 2024-07-05 PROCEDURE — G0103 PSA SCREENING: HCPCS | Performed by: INTERNAL MEDICINE

## 2024-07-05 PROCEDURE — 80048 BASIC METABOLIC PNL TOTAL CA: CPT | Performed by: INTERNAL MEDICINE

## 2024-07-05 PROCEDURE — 82306 VITAMIN D 25 HYDROXY: CPT | Performed by: INTERNAL MEDICINE

## 2024-07-05 PROCEDURE — 82607 VITAMIN B-12: CPT | Performed by: INTERNAL MEDICINE

## 2024-07-05 PROCEDURE — 80061 LIPID PANEL: CPT | Performed by: INTERNAL MEDICINE

## 2024-07-05 PROCEDURE — 36415 COLL VENOUS BLD VENIPUNCTURE: CPT

## 2024-07-18 ENCOUNTER — HOSPITAL ENCOUNTER (OUTPATIENT)
Dept: CT IMAGING | Facility: HOSPITAL | Age: 69
Discharge: HOME | End: 2024-07-18
Admitting: UROLOGY
Payer: MEDICARE

## 2024-07-18 DIAGNOSIS — N20.1 CALCULUS OF URETER: ICD-10-CM

## 2024-07-18 PROCEDURE — 74176 CT ABD & PELVIS W/O CONTRAST: CPT

## 2024-08-08 RX ORDER — BUSPIRONE HYDROCHLORIDE 10 MG/1
10 TABLET ORAL 2 TIMES DAILY PRN
Qty: 60 TABLET | Refills: 3 | Status: SHIPPED | OUTPATIENT
Start: 2024-08-08

## 2024-08-20 RX ORDER — ALLOPURINOL 300 MG/1
300 TABLET ORAL DAILY
Qty: 90 TABLET | Refills: 0 | Status: SHIPPED | OUTPATIENT
Start: 2024-08-20

## 2024-10-22 ENCOUNTER — TELEPHONE (OUTPATIENT)
Dept: FAMILY MEDICINE CLINIC | Facility: CLINIC | Age: 69
End: 2024-10-22
Payer: MEDICARE

## 2024-10-22 NOTE — TELEPHONE ENCOUNTER
Caller: Joe Noel    Relationship to patient: Self    Best call back number: [phone]     Date of positive COVID19 test: 10/22/2024      COVID19 symptoms: BODY ACHES, COUGH, LOW GRADE FEVER, RUNNING NOSE    Date of initial quarantine: 10/22/2024    Additional information or concerns: WANTS TO KNOW DO YOU NEED TO CALL HIM IN SOMETHING    What is the patients preferred pharmacy: [id number] - [place], [place] - [address] [facility] [address] - [phone]  - [phone] FX

## 2024-10-22 NOTE — TELEPHONE ENCOUNTER
COVID infection   Plan:   - Stay well hydrated, get plenty of rest   - Symptomatic treatment including over the counter nasal decongestant (Nyquil/Dayquil), Mucinex, cough suppressant (Robitussin or Delsym), Tylenol as needed-- Please be sure not to double up on medications as cold/cough medications frequently have more than one medication   - Hold on Paxlovid--do not always prescribe due to potential medication side effects, drug-drug interactions, and  efficacy against most recent COVID variants  - Follow up if new/worsening symptoms     Patient should quarantine at least 5 days from the onset of symptoms (or until symptoms improving and have been afebrile for at least 24 hours) and wear a mask/practice good hand hygiene for 5 days after the.  Additionally is not uncommon for COVID symptoms to last at least 1 to 2 weeks    Dennys Mathew MD

## 2024-10-23 NOTE — TELEPHONE ENCOUNTER
Spoke to patient, notified of the below recommendations in detail.  He stated understanding and had no further questions currently.

## 2024-10-29 RX ORDER — ROSUVASTATIN CALCIUM 10 MG/1
10 TABLET, COATED ORAL DAILY
Qty: 90 TABLET | Refills: 3 | Status: SHIPPED | OUTPATIENT
Start: 2024-10-29

## 2024-10-29 NOTE — TELEPHONE ENCOUNTER
Caller: Yumiko Noelsalomon LOPEZ    Relationship: Self    Best call back number: [phone]     Requested Prescriptions:   Requested Prescriptions     Pending Prescriptions Disp Refills    rosuvastatin (CRESTOR) 10 MG tablet 90 tablet 3     Sig: Take 1 tablet by mouth Daily.        [facility], [address] [phone] [facility] [phone] FX     Last office visit with prescribing clinician: 6/26/2024   Last telemedicine visit with prescribing clinician: Visit date not found   Next office visit with prescribing clinician: 7/1/2025     Additional details provided by patient:     Does the patient have less than a 3 day supply:  [] Yes  [] No    Would you like a call back once the refill request has been completed: [] Yes [] No    If the office needs to give you a call back, can they leave a voicemail: [] Yes [] No    April Bravo Man Rep   10/29/24 16:12 EDT

## 2024-11-05 RX ORDER — ALLOPURINOL 300 MG/1
300 TABLET ORAL DAILY
Qty: 90 TABLET | Refills: 0 | Status: SHIPPED | OUTPATIENT
Start: 2024-11-05

## 2024-12-17 RX ORDER — BUSPIRONE HYDROCHLORIDE 10 MG/1
10 TABLET ORAL 2 TIMES DAILY PRN
Qty: 60 TABLET | Refills: 3 | Status: SHIPPED | OUTPATIENT
Start: 2024-12-17

## 2025-02-03 RX ORDER — ALLOPURINOL 300 MG/1
300 TABLET ORAL DAILY
Qty: 90 TABLET | Refills: 0 | Status: SHIPPED | OUTPATIENT
Start: 2025-02-03

## 2025-03-17 ENCOUNTER — OFFICE VISIT (OUTPATIENT)
Age: 70
End: 2025-03-17
Payer: MEDICARE

## 2025-03-17 VITALS — HEART RATE: 64 BPM | WEIGHT: 191.2 LBS | BODY MASS INDEX: 27.37 KG/M2 | HEIGHT: 70 IN | OXYGEN SATURATION: 99 %

## 2025-03-17 DIAGNOSIS — G89.29 CHRONIC FOOT PAIN, RIGHT: Primary | ICD-10-CM

## 2025-03-17 DIAGNOSIS — M79.671 CHRONIC FOOT PAIN, RIGHT: Primary | ICD-10-CM

## 2025-03-17 DIAGNOSIS — M19.071 ARTHRITIS OF RIGHT FOOT: ICD-10-CM

## 2025-03-17 DIAGNOSIS — M21.961 FOOT DEFORMITY, RIGHT: ICD-10-CM

## 2025-03-17 RX ORDER — TRIAMCINOLONE ACETONIDE 40 MG/ML
20 INJECTION, SUSPENSION INTRA-ARTICULAR; INTRAMUSCULAR ONCE
Status: COMPLETED | OUTPATIENT
Start: 2025-03-17 | End: 2025-03-17

## 2025-03-17 RX ADMIN — TRIAMCINOLONE ACETONIDE 20 MG: 40 INJECTION, SUSPENSION INTRA-ARTICULAR; INTRAMUSCULAR at 11:04

## 2025-03-17 NOTE — PROGRESS NOTES
03/17/2025  Foot and Ankle Surgery - Established Patient/Follow-up  Provider: Dr. Maurice Pelletier DPM  Location: [facility]    Subjective:  Joe Noel is a 69 y.o. adult.     Chief Complaint   Patient presents with    Right Foot - Follow-up, Pain    Right Ankle - Follow-up, Pain    Follow-Up     Niurka Barrios MD  7/2/24       History of Present Illness    Patient returns for continued pain involving the right foot and ankle.  He states that symptoms have gradually progressed since last evaluation.  He did undergo steroid injection previously which lasted for several weeks.  He states that symptoms have been gradually getting worse and he is also concerned about his eyesight.  He states that he is unwilling to proceed with any type of surgery at this time.  He denies any progressive issues involving the left foot.      No Known Allergies    Current Outpatient Medications on File Prior to Visit   Medication Sig Dispense Refill    allopurinol (ZYLOPRIM) 300 MG tablet TAKE ONE TABLET BY MOUTH EVERY DAY 90 tablet 0    ALPRAZolam (XANAX) 1 MG tablet Take 0.5 tablets by mouth every night at bedtime. 30 tablet 3    busPIRone (BUSPAR) 10 MG tablet TAKE 1 TABLET BY MOUTH TWICE DAILY AS NEEDED FOR ANXIETY 60 tablet 3    rosuvastatin (CRESTOR) 10 MG tablet Take 1 tablet by mouth Daily. 90 tablet 3    VITAMIN D PO Take  by mouth.      Xiidra 5 % ophthalmic solution        Current Facility-Administered Medications on File Prior to Visit   Medication Dose Route Frequency Provider Last Rate Last Admin    cyanocobalamin injection 1,000 mcg  1,000 mcg Intramuscular Q28 Days Niurka Barrios MD   1,000 mcg at 05/20/21 1436    cyanocobalamin injection 1,000 mcg  1,000 mcg Intramuscular Q28 Days Niurka Barrios MD   1,000 mcg at 06/17/22 1628    cyanocobalamin injection 1,000 mcg  1,000 mcg Intramuscular Q28 Days Niurka Barrios MD   1,000 mcg at 06/20/23 1531       Objective   Pulse 64   Ht  "177.8 cm (70\")   Wt 86.7 kg (191 lb 3.2 oz)   SpO2 99%   BMI 27.43 kg/m²     Foot/Ankle Exam  Physical Exam  GENERAL  Orientation:  AAOx3  Affect:  appropriate  Gait:  antalgic     VASCULAR      Right Foot Vascularity   Normal vascular exam    Dorsalis pedis:  2+  Posterior tibial:  2+  Skin temperature:  warm  Edema grading:  None  CFT:  < 3 seconds  Pedal hair growth:  Present  Varicosities:  none      Left Foot Vascularity   Normal vascular exam    Dorsalis pedis:  2+  Posterior tibial:  2+  Skin temperature:  warm  Edema grading:  None  CFT:  < 3 seconds  Pedal hair growth:  Present  Varicosities:  none     NEUROLOGIC      Right Foot Neurologic   Light touch sensation: normal  Hot/Cold sensation: normal  Achilles reflex:  2+      Left Foot Neurologic   Light touch sensation: normal  Hot/Cold sensation:  normal  Achilles reflex:  2+     MUSCULOSKELETAL      Right Foot Musculoskeletal   Tenderness:  dorsal foot and navicular tenderness    Arch:  Normal      Left Foot Musculoskeletal   Arch:  Normal     MUSCLE STRENGTH      Right Foot Muscle Strength   Normal strength    Foot dorsiflexion:  5  Foot plantar flexion:  5  Foot inversion:  5  Foot eversion:  5      Left Foot Muscle Strength   Normal strength    Foot dorsiflexion:  5  Foot plantar flexion:  5  Foot inversion:  5  Foot eversion:  5     DERMATOLOGIC       Right Foot Dermatologic   Skin  Right foot skin is intact.   Nails comment:  Nails 1-5      Left Foot Dermatologic   Skin  Left foot skin is intact.   Nails comment:  Nails 1-5     TESTS      Right Foot Tests   Anterior drawer: negative  Varus tilt: negative      Left Foot Tests   Anterior drawer: negative  Varus tilt: negative     Right foot additional comments: Moderate rigidity involving the midfoot and rear foot.  Discomfort with palpation at the level of the talonavicular joint.     03/07/2023  Continued discomfort involving the lateral aspect of the talonavicular joint region. No gross deformity " or instability.     04/03/2024  Discomfort with palpation involving the dorsal aspect of the midfoot. No progressive deformity or instability. Mild pronation and forefoot abduction with stance. Bony prominence noted to the talonavicular joint region.    3/17/25: Continued pain involving the dorsal lateral aspect of the midfoot region.  Swelling to the talonavicular joint region.  No progressive deformity or instability neurovascular status remains intact.  Range of motion muscle strength is unchanged.      Results      Assessment & Plan   Diagnoses and all orders for this visit:    1. Chronic foot pain, right (Primary)  -     XR Foot 3+ View Right    2. Foot deformity, right  -     XR Foot 3+ View Right    3. Arthritis of right foot  -     XR Foot 3+ View Right      Assessment & Plan    Patient returns with continued issues involving the right foot.  He does have significant pain and limitation.  Denies any issues to the left foot.  Imaging was reviewed showing continued degenerative changes involving the talonavicular joint with findings very similar to imaging from April 2024.  Patient continues to have significant degenerative changes involving the talonavicular joint likely causing his lateral midfoot and ankle pain.  We did review further options and patient states that he is unwilling to proceed with any type of surgery at this time.  I did recommend a repeat steroid injection given that he is only receiving them once a year.  Patient understands and agrees.  Patient was instructed to follow-up with me in 3 months for reevaluation.Reviewed proper basic stretching and manual therapy exercises along with appropriate shoes and activity.  Discussed proper use and/or avoidance of OTC anti-inflammatories.  Patient is to call with any additional issues or concerns.  Greater than 20 minutes was spent before, during, and after evaluation for patient care excluding procedure.    The encounter note is created with the  "use of AI technology.  I do apologize if there are typos and/or confusion within the note.  Please feel free to contact me or my office with any questions or concerns.    alonavicular joint Steroid Injection: Right foot     Consent and time out was performed before proceeding with the procedure. The skin around the anterior lateral portal of the joint was cleansed with alcohol and initially anesthetized with approximately 1.0 mL of 1% lidocaine plain. The area was then prepped with betadine and allowed to dry.  Using aseptic technique and ultrasound, a solution totaling 1.5mL was injected into the ankle joint utilizing a 22g 1.5\" hypodermic needle. The solution contained 0.5cc of 0.5% Marcaine plain, 0.5cc of 1% lidocaine plain, and 0.5cc of Kenalog.  Mild compression was then applied to the injection site and bandage applied.  The patient noted immediate pain relief with active range of motion and neurovascular status remaining intact. The patient tolerated the injection well without complication.          Patient or patient representative verbalized consent for the use of Ambient Listening during the visit with  LUIS Pelletier DPM for chart documentation. 3/17/2025  09:46 EDT    LUIS Pelletier DPM  "

## 2025-03-21 ENCOUNTER — ON CAMPUS - OUTPATIENT (AMBULATORY)
Dept: URBAN - METROPOLITAN AREA HOSPITAL 2 | Facility: HOSPITAL | Age: 70
End: 2025-03-21
Payer: COMMERCIAL

## 2025-03-21 VITALS
RESPIRATION RATE: 16 BRPM | SYSTOLIC BLOOD PRESSURE: 105 MMHG | TEMPERATURE: 98.9 F | SYSTOLIC BLOOD PRESSURE: 111 MMHG | SYSTOLIC BLOOD PRESSURE: 109 MMHG | WEIGHT: 188 LBS | RESPIRATION RATE: 17 BRPM | DIASTOLIC BLOOD PRESSURE: 76 MMHG | SYSTOLIC BLOOD PRESSURE: 129 MMHG | HEART RATE: 62 BPM | HEART RATE: 59 BPM | DIASTOLIC BLOOD PRESSURE: 72 MMHG | RESPIRATION RATE: 20 BRPM | HEART RATE: 58 BPM | DIASTOLIC BLOOD PRESSURE: 79 MMHG | SYSTOLIC BLOOD PRESSURE: 106 MMHG | HEART RATE: 73 BPM | DIASTOLIC BLOOD PRESSURE: 82 MMHG | HEIGHT: 71 IN | DIASTOLIC BLOOD PRESSURE: 74 MMHG | SYSTOLIC BLOOD PRESSURE: 137 MMHG | OXYGEN SATURATION: 98 % | OXYGEN SATURATION: 95 % | HEART RATE: 70 BPM | OXYGEN SATURATION: 94 %

## 2025-03-21 DIAGNOSIS — K44.9 DIAPHRAGMATIC HERNIA WITHOUT OBSTRUCTION OR GANGRENE: ICD-10-CM

## 2025-03-21 DIAGNOSIS — R13.10 DYSPHAGIA, UNSPECIFIED: ICD-10-CM

## 2025-03-21 DIAGNOSIS — K20.80 OTHER ESOPHAGITIS WITHOUT BLEEDING: ICD-10-CM

## 2025-03-21 DIAGNOSIS — K30 FUNCTIONAL DYSPEPSIA: ICD-10-CM

## 2025-03-21 DIAGNOSIS — K22.2 ESOPHAGEAL OBSTRUCTION: ICD-10-CM

## 2025-03-21 PROCEDURE — 43450 DILATE ESOPHAGUS 1/MULT PASS: CPT | Performed by: INTERNAL MEDICINE

## 2025-03-21 PROCEDURE — 43235 EGD DIAGNOSTIC BRUSH WASH: CPT | Performed by: INTERNAL MEDICINE

## 2025-03-21 RX ORDER — PANTOPRAZOLE SODIUM 40 MG/1
40 TABLET, DELAYED RELEASE ORAL
Qty: 90 | Refills: 3 | Status: ACTIVE
Start: 2025-03-21

## 2025-04-24 RX ORDER — ALLOPURINOL 300 MG/1
300 TABLET ORAL DAILY
Qty: 90 TABLET | Refills: 0 | Status: SHIPPED | OUTPATIENT
Start: 2025-04-24

## 2025-07-14 RX ORDER — ALLOPURINOL 300 MG/1
300 TABLET ORAL DAILY
Qty: 90 TABLET | Refills: 0 | Status: SHIPPED | OUTPATIENT
Start: 2025-07-14